# Patient Record
Sex: FEMALE | Race: WHITE | NOT HISPANIC OR LATINO | Employment: FULL TIME | URBAN - METROPOLITAN AREA
[De-identification: names, ages, dates, MRNs, and addresses within clinical notes are randomized per-mention and may not be internally consistent; named-entity substitution may affect disease eponyms.]

---

## 2023-03-23 ENCOUNTER — TELEPHONE (OUTPATIENT)
Dept: PRIMARY CARE | Facility: CLINIC | Age: 48
End: 2023-03-23

## 2023-03-23 NOTE — TELEPHONE ENCOUNTER
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    Work fax is [phone] to [employer].

## 2023-03-23 NOTE — TELEPHONE ENCOUNTER
Called patient, notified of message, patient expressed verbal understanding had no questions at this time.

## 2023-03-23 NOTE — TELEPHONE ENCOUNTER
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 What is the maximum amount of weight she is able to comfortably lift and carry? Thanks,  Leann Tidwell, DO

## 2023-03-23 NOTE — LETTER
March 23, 2023     Patient: Anjali Chamorro   YOB: 1975       To Whom It May Concern:    It is my medical opinion that Anjali Chamorro  should not be required to lift greater than 10 lbs due to her chronic medical conditions .    If you have any questions or concerns, please don't hesitate to call.         Sincerely,        Leann Tidwell, DO

## 2023-04-26 LAB
ALANINE AMINOTRANSFERASE (SGPT) (U/L) IN SER/PLAS: 19 U/L (ref 7–45)
ALBUMIN (G/DL) IN SER/PLAS: 4.2 G/DL (ref 3.4–5)
ALKALINE PHOSPHATASE (U/L) IN SER/PLAS: 116 U/L (ref 33–110)
ANION GAP IN SER/PLAS: 10 MMOL/L (ref 10–20)
ASPARTATE AMINOTRANSFERASE (SGOT) (U/L) IN SER/PLAS: 15 U/L (ref 9–39)
BILIRUBIN TOTAL (MG/DL) IN SER/PLAS: 0.6 MG/DL (ref 0–1.2)
CALCIDIOL (25 OH VITAMIN D3) (NG/ML) IN SER/PLAS: 14 NG/ML
CALCIUM (MG/DL) IN SER/PLAS: 9.2 MG/DL (ref 8.6–10.3)
CARBON DIOXIDE, TOTAL (MMOL/L) IN SER/PLAS: 28 MMOL/L (ref 21–32)
CHLORIDE (MMOL/L) IN SER/PLAS: 105 MMOL/L (ref 98–107)
CHOLESTEROL (MG/DL) IN SER/PLAS: 174 MG/DL (ref 0–199)
CHOLESTEROL IN HDL (MG/DL) IN SER/PLAS: 46.3 MG/DL
CHOLESTEROL/HDL RATIO: 3.8
COBALAMIN (VITAMIN B12) (PG/ML) IN SER/PLAS: 279 PG/ML (ref 211–911)
CREATININE (MG/DL) IN SER/PLAS: 0.58 MG/DL (ref 0.5–1.05)
ERYTHROCYTE DISTRIBUTION WIDTH (RATIO) BY AUTOMATED COUNT: 12.2 % (ref 11.5–14.5)
ERYTHROCYTE MEAN CORPUSCULAR HEMOGLOBIN CONCENTRATION (G/DL) BY AUTOMATED: 32.5 G/DL (ref 32–36)
ERYTHROCYTE MEAN CORPUSCULAR VOLUME (FL) BY AUTOMATED COUNT: 91 FL (ref 80–100)
ERYTHROCYTES (10*6/UL) IN BLOOD BY AUTOMATED COUNT: 5.02 X10E12/L (ref 4–5.2)
ESTIMATED AVERAGE GLUCOSE FOR HBA1C: 189 MG/DL
GFR FEMALE: >90 ML/MIN/1.73M2
GLUCOSE (MG/DL) IN SER/PLAS: 219 MG/DL (ref 74–99)
HEMATOCRIT (%) IN BLOOD BY AUTOMATED COUNT: 45.8 % (ref 36–46)
HEMOGLOBIN (G/DL) IN BLOOD: 14.9 G/DL (ref 12–16)
HEMOGLOBIN A1C/HEMOGLOBIN TOTAL IN BLOOD: 8.2 %
LDL: 103 MG/DL (ref 0–99)
LEUKOCYTES (10*3/UL) IN BLOOD BY AUTOMATED COUNT: 5.4 X10E9/L (ref 4.4–11.3)
PLATELETS (10*3/UL) IN BLOOD AUTOMATED COUNT: 322 X10E9/L (ref 150–450)
POTASSIUM (MMOL/L) IN SER/PLAS: 4.3 MMOL/L (ref 3.5–5.3)
PROTEIN TOTAL: 6.8 G/DL (ref 6.4–8.2)
SODIUM (MMOL/L) IN SER/PLAS: 139 MMOL/L (ref 136–145)
TRIGLYCERIDE (MG/DL) IN SER/PLAS: 123 MG/DL (ref 0–149)
UREA NITROGEN (MG/DL) IN SER/PLAS: 15 MG/DL (ref 6–23)
VLDL: 25 MG/DL (ref 0–40)

## 2023-04-28 ENCOUNTER — TELEPHONE (OUTPATIENT)
Dept: PRIMARY CARE | Facility: CLINIC | Age: 48
End: 2023-04-28

## 2023-04-28 NOTE — TELEPHONE ENCOUNTER
Pt called returning Loren's call regarding lab results. I told her the A1C results, and told her I would have Loren call her back if there was anything to be concerned about.

## 2023-05-30 DIAGNOSIS — B37.31 CANDIDIASIS OF VULVA AND VAGINA: ICD-10-CM

## 2023-05-31 RX ORDER — FLUCONAZOLE 150 MG/1
TABLET ORAL
Qty: 1 TABLET | Refills: 1 | Status: SHIPPED | OUTPATIENT
Start: 2023-05-31 | End: 2024-03-18

## 2023-06-05 ENCOUNTER — TELEPHONE (OUTPATIENT)
Dept: PRIMARY CARE | Facility: CLINIC | Age: 48
End: 2023-06-05

## 2023-06-05 NOTE — TELEPHONE ENCOUNTER
Received PA request for Rybelsus on CMM. Previously done 8/17/2022 and approved for a year. Can you let pharmacy know? Thanks,  Leann Tidwell, DO

## 2023-07-03 ENCOUNTER — TELEPHONE (OUTPATIENT)
Dept: PRIMARY CARE | Facility: CLINIC | Age: 48
End: 2023-07-03

## 2023-07-04 PROBLEM — M72.2 PLANTAR FASCIAL FIBROMATOSIS OF LEFT FOOT: Status: ACTIVE | Noted: 2023-07-04

## 2023-07-04 PROBLEM — F32.1 DEPRESSION, MAJOR, SINGLE EPISODE, MODERATE (MULTI): Status: ACTIVE | Noted: 2023-07-04

## 2023-07-04 PROBLEM — Z86.59 HISTORY OF CLAUSTROPHOBIA: Status: ACTIVE | Noted: 2023-07-04

## 2023-07-04 PROBLEM — G89.29 CHRONIC RADICULAR LOW BACK PAIN: Status: ACTIVE | Noted: 2023-07-04

## 2023-07-04 PROBLEM — K21.9 GERD (GASTROESOPHAGEAL REFLUX DISEASE): Status: ACTIVE | Noted: 2023-07-04

## 2023-07-04 PROBLEM — E53.8 VITAMIN B12 DEFICIENCY: Status: ACTIVE | Noted: 2023-07-04

## 2023-07-04 PROBLEM — G43.009 MIGRAINOUS HEADACHE WITHOUT AURA: Status: ACTIVE | Noted: 2023-07-04

## 2023-07-04 PROBLEM — F41.1 GENERALIZED ANXIETY DISORDER: Status: ACTIVE | Noted: 2023-07-04

## 2023-07-04 PROBLEM — M54.2 CHRONIC NECK PAIN: Status: ACTIVE | Noted: 2023-07-04

## 2023-07-04 PROBLEM — G89.29 CHRONIC NECK PAIN: Status: ACTIVE | Noted: 2023-07-04

## 2023-07-04 PROBLEM — E55.9 VITAMIN D DEFICIENCY: Status: ACTIVE | Noted: 2023-07-04

## 2023-07-04 PROBLEM — Z90.722 S/P BSO (BILATERAL SALPINGO-OOPHORECTOMY): Status: RESOLVED | Noted: 2023-07-04 | Resolved: 2023-07-04

## 2023-07-04 PROBLEM — E66.3 OVERWEIGHT (BMI 25.0-29.9): Status: ACTIVE | Noted: 2023-07-04

## 2023-07-04 PROBLEM — E11.65 UNCONTROLLED TYPE 2 DIABETES MELLITUS WITH HYPERGLYCEMIA (MULTI): Status: ACTIVE | Noted: 2023-07-04

## 2023-07-04 PROBLEM — M54.16 CHRONIC RADICULAR LOW BACK PAIN: Status: ACTIVE | Noted: 2023-07-04

## 2023-07-04 PROBLEM — Z98.890 HISTORY OF NECK SURGERY: Status: RESOLVED | Noted: 2023-07-04 | Resolved: 2023-07-04

## 2023-07-04 PROBLEM — Z90.711 S/P PARTIAL HYSTERECTOMY WITH REMAINING CERVICAL STUMP: Status: RESOLVED | Noted: 2023-07-04 | Resolved: 2023-07-04

## 2023-07-04 RX ORDER — TOPIRAMATE 100 MG/1
100 TABLET, FILM COATED ORAL NIGHTLY
COMMUNITY
End: 2024-03-18

## 2023-07-04 RX ORDER — GLIPIZIDE 10 MG/1
20 TABLET, FILM COATED, EXTENDED RELEASE ORAL 2 TIMES DAILY
COMMUNITY
End: 2024-03-18

## 2023-07-04 RX ORDER — INSULIN GLARGINE 100 [IU]/ML
80 INJECTION, SOLUTION SUBCUTANEOUS NIGHTLY
COMMUNITY
End: 2024-03-18

## 2023-07-04 RX ORDER — FLUOXETINE HYDROCHLORIDE 20 MG/1
20 CAPSULE ORAL DAILY
COMMUNITY
End: 2024-03-18

## 2023-07-04 RX ORDER — MECLIZINE HYDROCHLORIDE 25 MG/1
25 TABLET ORAL 3 TIMES DAILY PRN
COMMUNITY
End: 2024-03-18

## 2023-07-04 RX ORDER — SUMATRIPTAN SUCCINATE 100 MG/1
100 TABLET ORAL DAILY PRN
COMMUNITY
End: 2024-01-29

## 2023-07-04 RX ORDER — ONDANSETRON 4 MG/1
4 TABLET, FILM COATED ORAL EVERY 8 HOURS PRN
COMMUNITY
End: 2024-04-11

## 2023-08-07 ENCOUNTER — TELEPHONE (OUTPATIENT)
Dept: PRIMARY CARE | Facility: CLINIC | Age: 48
End: 2023-08-07

## 2023-08-31 DIAGNOSIS — E11.65 TYPE 2 DIABETES MELLITUS WITH HYPERGLYCEMIA (MULTI): ICD-10-CM

## 2023-08-31 RX ORDER — ORAL SEMAGLUTIDE 14 MG/1
TABLET ORAL
Qty: 30 TABLET | Refills: 11 | Status: SHIPPED | OUTPATIENT
Start: 2023-08-31 | End: 2024-03-22

## 2024-01-08 ENCOUNTER — TELEPHONE (OUTPATIENT)
Dept: PRIMARY CARE | Facility: CLINIC | Age: 49
End: 2024-01-08
Payer: COMMERCIAL

## 2024-01-29 DIAGNOSIS — G43.909 MIGRAINE, UNSPECIFIED, NOT INTRACTABLE, WITHOUT STATUS MIGRAINOSUS: ICD-10-CM

## 2024-01-29 RX ORDER — SUMATRIPTAN SUCCINATE 100 MG/1
TABLET ORAL
Qty: 9 TABLET | Refills: 11 | Status: SHIPPED | OUTPATIENT
Start: 2024-01-29

## 2024-03-18 ENCOUNTER — TELEPHONE (OUTPATIENT)
Dept: PRIMARY CARE | Facility: CLINIC | Age: 49
End: 2024-03-18
Payer: COMMERCIAL

## 2024-03-18 DIAGNOSIS — B37.31 CANDIDIASIS OF VULVA AND VAGINA: ICD-10-CM

## 2024-03-18 DIAGNOSIS — H81.10 BENIGN PAROXYSMAL VERTIGO, UNSPECIFIED EAR: ICD-10-CM

## 2024-03-18 DIAGNOSIS — F41.1 GENERALIZED ANXIETY DISORDER: ICD-10-CM

## 2024-03-18 DIAGNOSIS — G43.909 MIGRAINE, UNSPECIFIED, NOT INTRACTABLE, WITHOUT STATUS MIGRAINOSUS: ICD-10-CM

## 2024-03-18 DIAGNOSIS — E11.65 TYPE 2 DIABETES MELLITUS WITH HYPERGLYCEMIA (MULTI): ICD-10-CM

## 2024-03-18 RX ORDER — INSULIN GLARGINE 100 [IU]/ML
INJECTION, SOLUTION SUBCUTANEOUS
Qty: 24 ML | Refills: 11 | Status: SHIPPED | OUTPATIENT
Start: 2024-03-18 | End: 2024-04-19 | Stop reason: SDUPTHER

## 2024-03-18 RX ORDER — TOPIRAMATE 100 MG/1
TABLET, FILM COATED ORAL
Qty: 30 TABLET | Refills: 11 | Status: SHIPPED | OUTPATIENT
Start: 2024-03-18

## 2024-03-18 RX ORDER — FLUCONAZOLE 150 MG/1
TABLET ORAL
Qty: 1 TABLET | Refills: 1 | Status: SHIPPED | OUTPATIENT
Start: 2024-03-18 | End: 2024-04-22

## 2024-03-18 RX ORDER — MECLIZINE HYDROCHLORIDE 25 MG/1
25 TABLET ORAL 3 TIMES DAILY PRN
Qty: 30 TABLET | Refills: 11 | Status: SHIPPED | OUTPATIENT
Start: 2024-03-18

## 2024-03-18 RX ORDER — GLIPIZIDE 10 MG/1
10 TABLET, FILM COATED, EXTENDED RELEASE ORAL 2 TIMES DAILY
Qty: 180 TABLET | Refills: 3 | Status: SHIPPED | OUTPATIENT
Start: 2024-03-18

## 2024-03-18 RX ORDER — FLUOXETINE HYDROCHLORIDE 20 MG/1
20 CAPSULE ORAL DAILY
Qty: 30 CAPSULE | Refills: 11 | Status: SHIPPED | OUTPATIENT
Start: 2024-03-18

## 2024-03-21 ENCOUNTER — TELEPHONE (OUTPATIENT)
Dept: PRIMARY CARE | Facility: CLINIC | Age: 49
End: 2024-03-21
Payer: COMMERCIAL

## 2024-03-21 DIAGNOSIS — E11.65 TYPE 2 DIABETES MELLITUS WITH HYPERGLYCEMIA (MULTI): ICD-10-CM

## 2024-03-21 NOTE — TELEPHONE ENCOUNTER
New rx sent in on the 18th. I did the prior auth and received notification that medication is covered/prior auth is not needed. Can you let her know?     Can you verify that the insurance information we have on file is correct?  Thanks,  Leann Tidwell, DO

## 2024-03-21 NOTE — TELEPHONE ENCOUNTER
Talked with pt and she said that she is needing a refill on her Basaglar but it was needing a prior auth.

## 2024-03-22 RX ORDER — ORAL SEMAGLUTIDE 14 MG/1
TABLET ORAL
Qty: 30 TABLET | Refills: 11 | Status: SHIPPED | OUTPATIENT
Start: 2024-03-22 | End: 2024-04-11

## 2024-04-01 ENCOUNTER — TELEPHONE (OUTPATIENT)
Dept: PRIMARY CARE | Facility: CLINIC | Age: 49
End: 2024-04-01
Payer: COMMERCIAL

## 2024-04-01 DIAGNOSIS — E11.65 UNCONTROLLED TYPE 2 DIABETES MELLITUS WITH HYPERGLYCEMIA (MULTI): Primary | ICD-10-CM

## 2024-04-01 NOTE — TELEPHONE ENCOUNTER
Prior authorization request received via fax for [email or web address]     Form given to: PLACED IN LEAD MA'S BOX ON 4/1/2024

## 2024-04-11 DIAGNOSIS — R11.0 NAUSEA: ICD-10-CM

## 2024-04-11 RX ORDER — ONDANSETRON 4 MG/1
4 TABLET, FILM COATED ORAL EVERY 8 HOURS PRN
Qty: 30 TABLET | Refills: 11 | Status: SHIPPED | OUTPATIENT
Start: 2024-04-11

## 2024-04-11 RX ORDER — DULAGLUTIDE 0.75 MG/.5ML
0.75 INJECTION, SOLUTION SUBCUTANEOUS
Qty: 2 ML | Refills: 11 | Status: SHIPPED | OUTPATIENT
Start: 2024-04-14

## 2024-04-11 NOTE — TELEPHONE ENCOUNTER
Insurance is denying the Rybelsus again I let her insurance know that she has tried Victoza but they also want her to have tried weekly injectable Trulicity. Is she ok with trying Trulicity? Thanks,  Leann Tidwell, DO

## 2024-04-12 ENCOUNTER — TELEPHONE (OUTPATIENT)
Dept: PRIMARY CARE | Facility: CLINIC | Age: 49
End: 2024-04-12
Payer: COMMERCIAL

## 2024-04-12 DIAGNOSIS — E11.65 UNCONTROLLED TYPE 2 DIABETES MELLITUS WITH HYPERGLYCEMIA (MULTI): Primary | ICD-10-CM

## 2024-04-12 NOTE — TELEPHONE ENCOUNTER
Jardiance and Farxiga are a different type of medication that is in pill form if she would like to try either of those. Can you ask patient? Thanks,  Leann Tidwell, DO

## 2024-04-12 NOTE — TELEPHONE ENCOUNTER
Talked with pt and said she will do the Trulicity for the month she she already picked it up.  She would like to try the better of the two new medications (Jardiance or Farxiga) next month.  She was wondering if we would be able to send in a script for next month?  Thanks:)

## 2024-04-12 NOTE — TELEPHONE ENCOUNTER
Pt called and said that she picked up her Trulicity and doesn't want to do injections.  She isn't understanding why her Rybelsus isn't covered when it has been in the past.  Is there anything she can try that isn't injections? Please advise.

## 2024-04-15 ENCOUNTER — TELEPHONE (OUTPATIENT)
Dept: PRIMARY CARE | Facility: CLINIC | Age: 49
End: 2024-04-15
Payer: COMMERCIAL

## 2024-04-15 NOTE — TELEPHONE ENCOUNTER
I sent in the Jardiance now just incase it will require a prior auth. Ok to stop the Trulicity once she starts the Jardiance. Thanks,  Leann Tidwell, DO

## 2024-04-19 DIAGNOSIS — B37.31 CANDIDIASIS OF VULVA AND VAGINA: ICD-10-CM

## 2024-04-19 DIAGNOSIS — E11.65 TYPE 2 DIABETES MELLITUS WITH HYPERGLYCEMIA (MULTI): ICD-10-CM

## 2024-04-19 RX ORDER — INSULIN GLARGINE 100 [IU]/ML
80 INJECTION, SOLUTION SUBCUTANEOUS NIGHTLY
Qty: 24 ML | Refills: 11 | Status: SHIPPED | OUTPATIENT
Start: 2024-04-19

## 2024-04-19 NOTE — TELEPHONE ENCOUNTER
"Basaglar is not covered despite PA stating \"PA not needed\" I looked up the formulary and name brand Lantus is the preferred alternative. Do you want to send this in instead?  "

## 2024-04-22 RX ORDER — FLUCONAZOLE 150 MG/1
TABLET ORAL
Qty: 1 TABLET | Refills: 1 | Status: SHIPPED | OUTPATIENT
Start: 2024-04-22

## 2024-06-28 ENCOUNTER — TELEPHONE (OUTPATIENT)
Dept: PRIMARY CARE | Facility: CLINIC | Age: 49
End: 2024-06-28

## 2024-06-28 ENCOUNTER — OFFICE VISIT (OUTPATIENT)
Dept: PRIMARY CARE | Facility: CLINIC | Age: 49
End: 2024-06-28
Payer: COMMERCIAL

## 2024-06-28 VITALS
DIASTOLIC BLOOD PRESSURE: 77 MMHG | SYSTOLIC BLOOD PRESSURE: 115 MMHG | BODY MASS INDEX: 24.73 KG/M2 | OXYGEN SATURATION: 97 % | RESPIRATION RATE: 14 BRPM | HEART RATE: 95 BPM | HEIGHT: 69 IN | TEMPERATURE: 97.1 F | WEIGHT: 167 LBS

## 2024-06-28 DIAGNOSIS — E11.65 TYPE 2 DIABETES MELLITUS WITH HYPERGLYCEMIA (MULTI): ICD-10-CM

## 2024-06-28 DIAGNOSIS — H81.10 BENIGN PAROXYSMAL VERTIGO, UNSPECIFIED EAR: ICD-10-CM

## 2024-06-28 DIAGNOSIS — J30.2 SEASONAL ALLERGIES: Primary | ICD-10-CM

## 2024-06-28 PROCEDURE — 99213 OFFICE O/P EST LOW 20 MIN: CPT

## 2024-06-28 PROCEDURE — 3074F SYST BP LT 130 MM HG: CPT

## 2024-06-28 PROCEDURE — 3078F DIAST BP <80 MM HG: CPT

## 2024-06-28 PROCEDURE — 1036F TOBACCO NON-USER: CPT

## 2024-06-28 RX ORDER — ONDANSETRON 4 MG/1
TABLET, ORALLY DISINTEGRATING ORAL
COMMUNITY
Start: 2024-06-24

## 2024-06-28 RX ORDER — INSULIN GLARGINE 100 [IU]/ML
80 INJECTION, SOLUTION SUBCUTANEOUS NIGHTLY
Qty: 24 ML | Refills: 3 | Status: SHIPPED | OUTPATIENT
Start: 2024-06-28

## 2024-06-28 RX ORDER — MECLIZINE HYDROCHLORIDE 25 MG/1
25 TABLET ORAL 3 TIMES DAILY PRN
Qty: 30 TABLET | Refills: 11 | Status: SHIPPED | OUTPATIENT
Start: 2024-06-28

## 2024-06-28 RX ORDER — MINERAL OIL
180 ENEMA (ML) RECTAL DAILY
Qty: 30 TABLET | Refills: 5 | Status: SHIPPED | OUTPATIENT
Start: 2024-06-28 | End: 2025-06-28

## 2024-06-28 RX ORDER — GLIPIZIDE 10 MG/1
10 TABLET, FILM COATED, EXTENDED RELEASE ORAL 2 TIMES DAILY
Qty: 180 TABLET | Refills: 3 | Status: SHIPPED | OUTPATIENT
Start: 2024-06-28

## 2024-06-28 SDOH — ECONOMIC STABILITY: FOOD INSECURITY: WITHIN THE PAST 12 MONTHS, YOU WORRIED THAT YOUR FOOD WOULD RUN OUT BEFORE YOU GOT MONEY TO BUY MORE.: NEVER TRUE

## 2024-06-28 SDOH — ECONOMIC STABILITY: FOOD INSECURITY: WITHIN THE PAST 12 MONTHS, THE FOOD YOU BOUGHT JUST DIDN'T LAST AND YOU DIDN'T HAVE MONEY TO GET MORE.: NEVER TRUE

## 2024-06-28 ASSESSMENT — ANXIETY QUESTIONNAIRES
6. BECOMING EASILY ANNOYED OR IRRITABLE: NOT AT ALL
GAD7 TOTAL SCORE: 0
3. WORRYING TOO MUCH ABOUT DIFFERENT THINGS: NOT AT ALL
1. FEELING NERVOUS, ANXIOUS, OR ON EDGE: NOT AT ALL
2. NOT BEING ABLE TO STOP OR CONTROL WORRYING: NOT AT ALL
4. TROUBLE RELAXING: NOT AT ALL
IF YOU CHECKED OFF ANY PROBLEMS ON THIS QUESTIONNAIRE, HOW DIFFICULT HAVE THESE PROBLEMS MADE IT FOR YOU TO DO YOUR WORK, TAKE CARE OF THINGS AT HOME, OR GET ALONG WITH OTHER PEOPLE: NOT DIFFICULT AT ALL
5. BEING SO RESTLESS THAT IT IS HARD TO SIT STILL: NOT AT ALL
7. FEELING AFRAID AS IF SOMETHING AWFUL MIGHT HAPPEN: NOT AT ALL

## 2024-06-28 ASSESSMENT — LIFESTYLE VARIABLES
SKIP TO QUESTIONS 9-10: 1
HOW OFTEN DO YOU HAVE SIX OR MORE DRINKS ON ONE OCCASION: NEVER
HOW OFTEN DO YOU HAVE A DRINK CONTAINING ALCOHOL: NEVER
HOW MANY STANDARD DRINKS CONTAINING ALCOHOL DO YOU HAVE ON A TYPICAL DAY: PATIENT DOES NOT DRINK
AUDIT-C TOTAL SCORE: 0

## 2024-06-28 ASSESSMENT — ENCOUNTER SYMPTOMS
GASTROINTESTINAL NEGATIVE: 1
CARDIOVASCULAR NEGATIVE: 1
RESPIRATORY NEGATIVE: 1
ALLERGIC/IMMUNOLOGIC NEGATIVE: 1
HEMATOLOGIC/LYMPHATIC NEGATIVE: 1
EYES NEGATIVE: 1
PSYCHIATRIC NEGATIVE: 1
NEUROLOGICAL NEGATIVE: 1
ENDOCRINE NEGATIVE: 1
CONSTITUTIONAL NEGATIVE: 1

## 2024-06-28 ASSESSMENT — PAIN SCALES - GENERAL: PAINLEVEL: 10-WORST PAIN EVER

## 2024-06-28 ASSESSMENT — PATIENT HEALTH QUESTIONNAIRE - PHQ9
2. FEELING DOWN, DEPRESSED OR HOPELESS: NOT AT ALL
1. LITTLE INTEREST OR PLEASURE IN DOING THINGS: NOT AT ALL
SUM OF ALL RESPONSES TO PHQ9 QUESTIONS 1 & 2: 0

## 2024-06-28 NOTE — PATIENT INSTRUCTIONS
Thank you for coming to see me today.  If you have any questions or concerns following our visit, please contact the office.  Phone: [phone]    Follow up with Dr. Tidwell as scheduled    1)  I have ordered lab work for you to have completed before your appointment with Dr. Tidwell

## 2024-06-28 NOTE — LETTER
June 28, 2024     Patient: Anjali Chamorro   YOB: 1975   Date of Visit: 6/28/2024       To Whom It May Concern:    Anjali Chamorro was seen in my clinic on 6/28/2024 at. Please excuse Anjali for her absence from work on 6/28 and 6/29 due to illness and to make her appointment.    If you have any questions or concerns, please don't hesitate to call.         Sincerely,         Monika Carcamo, APRN-CNP        CC: No Recipients

## 2024-06-28 NOTE — TELEPHONE ENCOUNTER
Prior authorization request received via fax for FEXOFENADINE     Form given to: PLACED IN LEAD MA'S BOX ON 6/28     Prior authorization request received via fax for BASAGLAR KWIKPEN     Form given to: PLACED IN LEAD MA'S BOX ON 6/28

## 2024-06-28 NOTE — PROGRESS NOTES
"Subjective   Patient ID: Anjali Chamorro is a 49 y.o. female who presents for Foot Swelling (For 1 month).    HPI   Anjali presents for issues with side effects that started when she started taking Jardiance and Trulicity- she has also had issues with constipation since starting the trulicity. She would like to go back on her glipizide and Basaglar because she did not have any of those side effects.     Review of Systems   Constitutional: Negative.    HENT: Negative.     Eyes: Negative.    Respiratory: Negative.     Cardiovascular: Negative.    Gastrointestinal: Negative.    Endocrine: Negative.    Genitourinary: Negative.    Musculoskeletal:         Feet swelling   Skin: Negative.    Allergic/Immunologic: Negative.    Neurological: Negative.    Hematological: Negative.    Psychiatric/Behavioral: Negative.         Objective   /77 (BP Location: Right arm, Patient Position: Sitting)   Pulse 95   Temp 36.2 °C (97.1 °F) (Temporal)   Resp 14   Ht 1.74 m (5' 8.5\")   Wt 75.8 kg (167 lb)   SpO2 97%   BMI 25.02 kg/m²     Physical Exam  Cardiovascular:      Rate and Rhythm: Normal rate and regular rhythm.      Pulses: Normal pulses.      Heart sounds: Normal heart sounds.   Pulmonary:      Effort: Pulmonary effort is normal.      Breath sounds: Normal breath sounds.   Musculoskeletal:         General: Swelling present.      Comments: Bilateral foot swelling   Skin:     Capillary Refill: Capillary refill takes less than 2 seconds.   Neurological:      Mental Status: She is oriented to person, place, and time.   Psychiatric:         Mood and Affect: Mood normal.         Behavior: Behavior normal.         Thought Content: Thought content normal.         Judgment: Judgment normal.         Assessment/Plan   Problem List Items Addressed This Visit    None  Visit Diagnoses         Codes    Seasonal allergies    -  Primary J30.2    Relevant Medications    fexofenadine (Allegra) 180 mg tablet    Type 2 diabetes mellitus " with hyperglycemia (Multi)     E11.65    Relevant Medications    insulin glargine (Basaglar KwikPen U-100 Insulin) 100 unit/mL (3 mL) pen    glipiZIDE XL (Glucotrol XL) 10 mg 24 hr tablet    Other Relevant Orders    Comprehensive Metabolic Panel    Hemoglobin A1C    Lipid Panel    Benign paroxysmal vertigo, unspecified ear     H81.10    Relevant Medications    meclizine (Antivert) 25 mg tablet    Other Relevant Orders    Vitamin D 25-Hydroxy,Total (for eval of Vitamin D levels)    CBC    Vitamin B12

## 2024-07-03 ENCOUNTER — TELEPHONE (OUTPATIENT)
Dept: PRIMARY CARE | Facility: CLINIC | Age: 49
End: 2024-07-03
Payer: COMMERCIAL

## 2024-07-03 NOTE — TELEPHONE ENCOUNTER
PA for the generic Allergra was denied. Her insurance wants her to try desloratadine so I sent in rx for that medication. Can you let her know about change in medication due to insurance? Thanks,  Leann Tidwell, DO

## 2024-07-03 NOTE — TELEPHONE ENCOUNTER
Prior authorization request received via fax for levocetirizine dihydrochloride    Form given to: PLACED IN LEAD MA'S BOX ON 7/3/2024

## 2024-07-08 ENCOUNTER — TELEPHONE (OUTPATIENT)
Dept: PRIMARY CARE | Facility: CLINIC | Age: 49
End: 2024-07-08
Payer: COMMERCIAL

## 2024-07-08 DIAGNOSIS — J30.89 ENVIRONMENTAL AND SEASONAL ALLERGIES: ICD-10-CM

## 2024-07-08 DIAGNOSIS — E11.65 TYPE 2 DIABETES MELLITUS WITH HYPERGLYCEMIA (MULTI): ICD-10-CM

## 2024-07-08 NOTE — TELEPHONE ENCOUNTER
Pt called in stating that [facility] did not receive her insulin glargine (Basaglar KwikPen U-100 Insulin) 100 unit/mL (3 mL) pen and desloratadine (Clarinex Reditab) 5 mg disintegrating tablet . Please advise.

## 2024-07-09 RX ORDER — DESLORATADINE 5 MG/1
5 TABLET, ORALLY DISINTEGRATING ORAL DAILY
Qty: 30 TABLET | Refills: 11 | OUTPATIENT
Start: 2024-07-09 | End: 2025-07-09

## 2024-07-09 RX ORDER — INSULIN GLARGINE 100 [IU]/ML
80 INJECTION, SOLUTION SUBCUTANEOUS NIGHTLY
Qty: 24 ML | Refills: 3 | OUTPATIENT
Start: 2024-07-09

## 2024-07-17 ENCOUNTER — TELEPHONE (OUTPATIENT)
Dept: PRIMARY CARE | Facility: CLINIC | Age: 49
End: 2024-07-17
Payer: COMMERCIAL

## 2024-07-17 NOTE — TELEPHONE ENCOUNTER
Pt called in requesting an update on the status of the prior authorization for her Basaglar Kwikipen. Pt states she has still not received this. Please advise.

## 2024-07-17 NOTE — TELEPHONE ENCOUNTER
Patient called back. This does in fact need a PA. This was the number that she was given to give to us to call [phone].    Patient wants it noted that all other medications make her sick, this is the only one that doesn't make her sick    Routing to Sean for insight.

## 2024-07-19 NOTE — TELEPHONE ENCOUNTER
PA was submitted over the phone, should have 24 hr turn-around time. Patient was notified. I will call her once we get the response from her insurance.

## 2024-08-02 NOTE — TELEPHONE ENCOUNTER
PA submitted to Jessica (PA not needed for [employer]). I made sure that her allergies were included in the information submitted. Can you let her know? Thanks,  Leann Tidwell, DO

## 2024-08-02 NOTE — TELEPHONE ENCOUNTER
That information was given over the phone to the representative and I have not seen a response to from her plan yet. Please advise.

## 2024-08-02 NOTE — TELEPHONE ENCOUNTER
Pt called back in and stated she received a denial letter today. Pt states the letter says the medication was denied due to no supporting documentation that she tried lantus and it does not work. Pt states she told PCP that she is allergic to lantus and it makes her have severe vomiting. Pt is unsure what the next steps are and expressed frustration that this documentation was not sent in, please advise.

## 2024-08-02 NOTE — TELEPHONE ENCOUNTER
I tried doing the PA for her [employer] and it says PA not needed for this medication. Will try doing PA for her [place] insurance.   Leann Tidwell, DO

## 2024-08-07 NOTE — TELEPHONE ENCOUNTER
Got notification that [employer] [employer] is incorrect plan. Resubmitted to [employer].   Leann Tidwell, DO

## 2024-08-27 ENCOUNTER — DOCUMENTATION (OUTPATIENT)
Dept: PRIMARY CARE | Facility: CLINIC | Age: 49
End: 2024-08-27
Payer: COMMERCIAL

## 2024-09-16 ENCOUNTER — OFFICE VISIT (OUTPATIENT)
Dept: URGENT CARE | Age: 49
End: 2024-09-16
Payer: COMMERCIAL

## 2024-09-16 VITALS
HEART RATE: 68 BPM | DIASTOLIC BLOOD PRESSURE: 79 MMHG | RESPIRATION RATE: 16 BRPM | SYSTOLIC BLOOD PRESSURE: 109 MMHG | TEMPERATURE: 97.4 F | OXYGEN SATURATION: 98 %

## 2024-09-16 DIAGNOSIS — M79.672 FOOT PAIN, LEFT: ICD-10-CM

## 2024-09-16 DIAGNOSIS — M79.672 FOOT PAIN, LEFT: Primary | ICD-10-CM

## 2024-09-16 PROCEDURE — 3078F DIAST BP <80 MM HG: CPT | Performed by: NURSE PRACTITIONER

## 2024-09-16 PROCEDURE — 3074F SYST BP LT 130 MM HG: CPT | Performed by: NURSE PRACTITIONER

## 2024-09-16 PROCEDURE — 99214 OFFICE O/P EST MOD 30 MIN: CPT | Performed by: NURSE PRACTITIONER

## 2024-09-16 PROCEDURE — 73630 X-RAY EXAM OF FOOT: CPT | Performed by: NURSE PRACTITIONER

## 2024-09-16 NOTE — PROGRESS NOTES
Subjective   Patient ID: Anjali Chamorro is a 49 y.o. female. They present today with a chief complaint of Foot Pain (L foot pain s/p fall on Friday/Tripped over tree roots).    History of Present Illness  HPI  Patient is a 49-year-old female who Presents with foot injury.  She states she tripped over a branch that was on the ground in her yard falling on Friday.  She has an abrasion to her left shin and bruising and pain to her left foot.   Past Medical History  Allergies as of 09/16/2024 - Reviewed 06/28/2024   Allergen Reaction Noted    Prednisone Swelling 07/04/2023    Aspirin Other 07/04/2023    Famotidine Other 07/04/2023    Insulin aspart u-100 Other 07/04/2023    Insulin detemir Other 07/04/2023    Insulin glargine Nausea Only and Other 07/04/2023    Metformin Other 07/04/2023    Penicillins Hives and Angioedema 07/04/2023    Pioglitazone Nausea Only and Other 07/04/2023    Sodium citrate-citric acid Other 07/04/2023    Adhesive tape-silicones Rash 07/04/2023    Chlorhexidine Rash 07/04/2023    Povidone-iodine Rash 07/04/2023       (Not in a hospital admission)       Past Medical History:   Diagnosis Date    Body mass index (BMI) 26.0-26.9, adult 06/23/2020    Body mass index (BMI) of 26.0 to 26.9 in adult    Burn of unspecified degree of unspecified hand, unspecified site, initial encounter     Burn of hand    Candidiasis of skin and nail 04/14/2021    Candidal intertrigo    Candidiasis, unspecified 11/01/2019    Yeast infection    Encounter for follow-up examination after completed treatment for conditions other than malignant neoplasm 12/05/2019    Postop check    Encounter for other preprocedural examination 06/08/2021    Preoperative examination    Encounter for other specified surgical aftercare 12/05/2019    Encounter for postoperative wound check    History of neck surgery 07/04/2023    Mixed irritable bowel syndrome     Irritable bowel syndrome with constipation and diarrhea    Muscle weakness  (generalized) 12/12/2019    Weakness of trunk musculature    Nausea     Nausea in adult    Other injury of unspecified body region, initial encounter 12/08/2020    Open wound    Other specified counseling     Counseling on health promotion and disease prevention    Other specified counseling 10/31/2019    Encounter for surgical counseling    Other specified disorders of muscle 12/12/2019    Hamstring tightness    Otitis media, unspecified, right ear     Right otitis media    Periapical abscess without sinus 12/09/2019    Dental infection    Personal history of other complications of pregnancy, childbirth and the puerperium     History of galactorrhea    Personal history of other diseases of the digestive system     History of esophageal reflux    Personal history of other diseases of the digestive system 12/18/2020    History of constipation    Personal history of other diseases of the digestive system 03/11/2020    History of esophageal ulcer    Personal history of other diseases of the female genital tract 10/31/2019    History of vaginitis    Personal history of other diseases of the female genital tract 10/31/2019    History of vaginal discharge    Personal history of other diseases of the musculoskeletal system and connective tissue     History of trigger finger    Personal history of other diseases of the respiratory system     History of influenza    Personal history of other diseases of the respiratory system     History of sinusitis    Personal history of other endocrine, nutritional and metabolic disease     History of hyperlipidemia    Personal history of other endocrine, nutritional and metabolic disease     History of type 2 diabetes mellitus    Personal history of other endocrine, nutritional and metabolic disease     History of diabetes mellitus    Personal history of other endocrine, nutritional and metabolic disease 12/15/2020    History of type 2 diabetes mellitus    Personal history of other  infectious and parasitic diseases     History of herpes genitalis    Personal history of other infectious and parasitic diseases     History of herpes genitalis    Personal history of other infectious and parasitic diseases 02/10/2022    History of candidiasis of vagina    Personal history of other specified conditions     History of vertigo    Personal history of other specified conditions     History of headache    Personal history of other specified conditions 08/14/2021    History of vertigo    Personal history of other specified conditions 04/03/2017    History of heartburn    Personal history of other specified conditions 10/24/2018    History of epigastric pain    Personal history of other specified conditions 04/14/2021    History of dysphagia    Personal history of other specified conditions     History of nausea and vomiting    Personal history of urinary (tract) infections     History of acute cystitis    Personal history of urinary (tract) infections 04/14/2021    History of urinary tract infection    S/P BSO (bilateral salpingo-oophorectomy) 07/04/2023    S/p partial hysterectomy with remaining cervical stump 07/04/2023    Sprain of unspecified part of left wrist and hand, initial encounter 07/12/2020    Hand sprain, left, initial encounter    Strain of muscle, fascia and tendon at neck level, initial encounter     Cervical strain    Unspecified injury of left shoulder and upper arm, initial encounter 07/12/2020    Upper extremity injury, left, initial encounter    Unspecified sprain of left wrist, initial encounter 07/12/2020    Wrist sprain, left, initial encounter    Urinary tract infection, site not specified 07/19/2021    Acute UTI       Past Surgical History:   Procedure Laterality Date    ANKLE SURGERY  09/06/2016    Ankle Surgery    FOOT SURGERY  09/06/2016    Foot Surgery    HYSTERECTOMY  09/06/2016    Hysterectomy    NECK SURGERY  09/06/2016    Neck Surgery    OTHER SURGICAL HISTORY   10/31/2019    Mastectomy bilateral    OTHER SURGICAL HISTORY  04/14/2021    Oophorectomy bilateral    OTHER SURGICAL HISTORY  10/14/2021    Trigger finger repair    OTHER SURGICAL HISTORY  10/22/2018    Neurorrhaphy Left Thumb    TUBAL LIGATION  09/06/2016    Tubal Ligation        reports that she has never smoked. She has never been exposed to tobacco smoke. She has never used smokeless tobacco. She reports that she does not drink alcohol and does not use drugs.    Review of Systems  Review of Systems  Gen: No fatigue, fever, sweats.  Head: No headache, trauma.  Eyes: No vision loss, double vision, drainage, eye pain.  ENT: No hearing changes, pain, epistaxis, congestion  Cardiac: No chest pain  Pulmonary: No shortness of breath,  pleuritic pain,   Heme/lymph: No swollen glands  GI: No abdominal pain, nausea, vomiting, diarrhea  : No  dysuria, frequency, urgency, hematuria  Musculoskeletal: No limb pain, joint pain, back pain, joint swelling or stiffness.  Skin: No rashes, pruritus, lumps, lesions.  Neuro: No Numbness, tingling, or weakness.  Psych: No  anxiety     Review of systems is otherwise negative unless stated above or in history of present illness.                             Objective    Vitals:    09/16/24 0953   BP: 109/79   Pulse: 68   Resp: 16   Temp: 36.3 °C (97.4 °F)   SpO2: 98%     No LMP recorded.    Physical Exam  Foot Injury:  General: Vitals noted. No distress. Afebrile.  Neck: Supple. No adenopathy.  Cardiac: Regular rate and rhythm. No murmur.  Pulmonary: Equal breath sounds bilaterally. No adventitious breath sounds.  Abdomen: Soft, nontender, nonsurgical. Normoactive bowel sounds  Back: Nontender throughout  Lower extremity: Exam of the left foot shows + swelling and bruising to base of great toe on top and plantar aspect. The ankle is non-tender. + shin abrasion on left.  There is no deformity, edema, ecchymosis. Is neurovascularly intact distally. The remainder of the lower extremity is  non-tender.  Skin: + shin abrasion on left  Neuro: No focal neurologic deficits, NIH score of 0.  Procedures    Point of Care Test & Imaging Results from this visit  No results found for this visit on 09/16/24.   No results found.    Diagnostic study results (if any) were reviewed by MARCIN Hensley.    Assessment/Plan   Allergies, medications, history, and pertinent labs/EKGs/Imaging reviewed by MARCIN Hensley.     Medical Decision Making  History and physical is consistent with foot contusion.  No signs of fracture on xray.  No dislocation of joint.  Rest ice and elevation along with tylenol or motrin for pain.    Orders and Diagnoses  There are no diagnoses linked to this encounter.    Medical Admin Record      Follow Up Instructions  No follow-ups on file.    Patient disposition: Home    Electronically signed by MARCIN Hensley  9:56 AM

## 2024-09-16 NOTE — PATIENT INSTRUCTIONS
No fracture on xray.  You have a contusion which is bruising.  Rest ice and elevation with tylenol or motrin for pain.   21-Jul-2023 16:26

## 2024-10-02 ENCOUNTER — LAB (OUTPATIENT)
Dept: LAB | Facility: LAB | Age: 49
End: 2024-10-02
Payer: COMMERCIAL

## 2024-10-02 DIAGNOSIS — H81.10 BENIGN PAROXYSMAL VERTIGO, UNSPECIFIED EAR: ICD-10-CM

## 2024-10-02 DIAGNOSIS — E11.65 TYPE 2 DIABETES MELLITUS WITH HYPERGLYCEMIA (MULTI): ICD-10-CM

## 2024-10-02 LAB
25(OH)D3 SERPL-MCNC: 13 NG/ML (ref 30–100)
ALBUMIN SERPL BCP-MCNC: 4 G/DL (ref 3.4–5)
ALP SERPL-CCNC: 165 U/L (ref 33–110)
ALT SERPL W P-5'-P-CCNC: 31 U/L (ref 7–45)
ANION GAP SERPL CALC-SCNC: 11 MMOL/L (ref 10–20)
AST SERPL W P-5'-P-CCNC: 30 U/L (ref 9–39)
BILIRUB SERPL-MCNC: 0.6 MG/DL (ref 0–1.2)
BUN SERPL-MCNC: 17 MG/DL (ref 6–23)
CALCIUM SERPL-MCNC: 8.4 MG/DL (ref 8.6–10.3)
CHLORIDE SERPL-SCNC: 103 MMOL/L (ref 98–107)
CHOLEST SERPL-MCNC: 219 MG/DL (ref 0–199)
CHOLESTEROL/HDL RATIO: 4.5
CO2 SERPL-SCNC: 27 MMOL/L (ref 21–32)
CREAT SERPL-MCNC: 0.55 MG/DL (ref 0.5–1.05)
EGFRCR SERPLBLD CKD-EPI 2021: >90 ML/MIN/1.73M*2
ERYTHROCYTE [DISTWIDTH] IN BLOOD BY AUTOMATED COUNT: 13.2 % (ref 11.5–14.5)
GLUCOSE SERPL-MCNC: 255 MG/DL (ref 74–99)
HCT VFR BLD AUTO: 46 % (ref 36–46)
HDLC SERPL-MCNC: 48.9 MG/DL
HGB BLD-MCNC: 15 G/DL (ref 12–16)
LDLC SERPL CALC-MCNC: 152 MG/DL
MCH RBC QN AUTO: 28.8 PG (ref 26–34)
MCHC RBC AUTO-ENTMCNC: 32.6 G/DL (ref 32–36)
MCV RBC AUTO: 88 FL (ref 80–100)
NON HDL CHOLESTEROL: 170 MG/DL (ref 0–149)
NRBC BLD-RTO: 0 /100 WBCS (ref 0–0)
PLATELET # BLD AUTO: 301 X10*3/UL (ref 150–450)
POTASSIUM SERPL-SCNC: 4.3 MMOL/L (ref 3.5–5.3)
PROT SERPL-MCNC: 6.5 G/DL (ref 6.4–8.2)
RBC # BLD AUTO: 5.21 X10*6/UL (ref 4–5.2)
SODIUM SERPL-SCNC: 137 MMOL/L (ref 136–145)
TRIGL SERPL-MCNC: 89 MG/DL (ref 0–149)
VIT B12 SERPL-MCNC: 411 PG/ML (ref 211–911)
VLDL: 18 MG/DL (ref 0–40)
WBC # BLD AUTO: 5.2 X10*3/UL (ref 4.4–11.3)

## 2024-10-02 PROCEDURE — 82607 VITAMIN B-12: CPT

## 2024-10-02 PROCEDURE — 83036 HEMOGLOBIN GLYCOSYLATED A1C: CPT

## 2024-10-02 PROCEDURE — 80053 COMPREHEN METABOLIC PANEL: CPT

## 2024-10-02 PROCEDURE — 85027 COMPLETE CBC AUTOMATED: CPT

## 2024-10-02 PROCEDURE — 80061 LIPID PANEL: CPT

## 2024-10-02 PROCEDURE — 82306 VITAMIN D 25 HYDROXY: CPT

## 2024-10-02 PROCEDURE — 36415 COLL VENOUS BLD VENIPUNCTURE: CPT

## 2024-10-03 LAB
EST. AVERAGE GLUCOSE BLD GHB EST-MCNC: 324 MG/DL
HBA1C MFR BLD: 12.9 %

## 2024-10-07 ENCOUNTER — APPOINTMENT (OUTPATIENT)
Dept: PRIMARY CARE | Facility: CLINIC | Age: 49
End: 2024-10-07
Payer: COMMERCIAL

## 2024-10-07 VITALS
HEART RATE: 74 BPM | HEIGHT: 69 IN | RESPIRATION RATE: 20 BRPM | SYSTOLIC BLOOD PRESSURE: 105 MMHG | BODY MASS INDEX: 25.18 KG/M2 | OXYGEN SATURATION: 99 % | WEIGHT: 170 LBS | DIASTOLIC BLOOD PRESSURE: 64 MMHG | TEMPERATURE: 96.8 F

## 2024-10-07 DIAGNOSIS — E11.65 UNCONTROLLED TYPE 2 DIABETES MELLITUS WITH HYPERGLYCEMIA: Primary | ICD-10-CM

## 2024-10-07 DIAGNOSIS — B37.31 CANDIDIASIS OF VULVA AND VAGINA: ICD-10-CM

## 2024-10-07 PROBLEM — Z90.13 HISTORY OF BILATERAL MASTECTOMY: Status: ACTIVE | Noted: 2024-10-07

## 2024-10-07 PROCEDURE — 3008F BODY MASS INDEX DOCD: CPT | Performed by: FAMILY MEDICINE

## 2024-10-07 PROCEDURE — 3046F HEMOGLOBIN A1C LEVEL >9.0%: CPT | Performed by: FAMILY MEDICINE

## 2024-10-07 PROCEDURE — 3050F LDL-C >= 130 MG/DL: CPT | Performed by: FAMILY MEDICINE

## 2024-10-07 PROCEDURE — 3078F DIAST BP <80 MM HG: CPT | Performed by: FAMILY MEDICINE

## 2024-10-07 PROCEDURE — 99214 OFFICE O/P EST MOD 30 MIN: CPT | Performed by: FAMILY MEDICINE

## 2024-10-07 PROCEDURE — 3074F SYST BP LT 130 MM HG: CPT | Performed by: FAMILY MEDICINE

## 2024-10-07 PROCEDURE — 1036F TOBACCO NON-USER: CPT | Performed by: FAMILY MEDICINE

## 2024-10-07 RX ORDER — INSULIN GLARGINE 300 U/ML
40 INJECTION, SOLUTION SUBCUTANEOUS NIGHTLY
Qty: 4.5 ML | Refills: 11 | Status: SHIPPED | OUTPATIENT
Start: 2024-10-07 | End: 2025-11-16

## 2024-10-07 RX ORDER — MICONAZOLE NITRATE 2 %
1 CREAM WITH APPLICATOR VAGINAL NIGHTLY
Qty: 45 G | Refills: 0 | Status: SHIPPED | OUTPATIENT
Start: 2024-10-07 | End: 2024-10-14

## 2024-10-07 RX ORDER — BLOOD-GLUCOSE SENSOR
EACH MISCELLANEOUS
Qty: 2 EACH | Refills: 11 | Status: SHIPPED | OUTPATIENT
Start: 2024-10-07

## 2024-10-07 RX ORDER — FLUCONAZOLE 150 MG/1
150 TABLET ORAL
Qty: 3 TABLET | Refills: 0 | Status: SHIPPED | OUTPATIENT
Start: 2024-10-07 | End: 2024-10-14

## 2024-10-07 RX ORDER — INSULIN GLARGINE 300 [IU]/ML
40 INJECTION, SOLUTION SUBCUTANEOUS NIGHTLY
Qty: 4.5 ML | Refills: 11 | Status: SHIPPED | OUTPATIENT
Start: 2024-10-07 | End: 2024-10-07 | Stop reason: SDUPTHER

## 2024-10-07 SDOH — ECONOMIC STABILITY: FOOD INSECURITY: WITHIN THE PAST 12 MONTHS, THE FOOD YOU BOUGHT JUST DIDN'T LAST AND YOU DIDN'T HAVE MONEY TO GET MORE.: NEVER TRUE

## 2024-10-07 SDOH — ECONOMIC STABILITY: FOOD INSECURITY: WITHIN THE PAST 12 MONTHS, YOU WORRIED THAT YOUR FOOD WOULD RUN OUT BEFORE YOU GOT MONEY TO BUY MORE.: NEVER TRUE

## 2024-10-07 ASSESSMENT — ENCOUNTER SYMPTOMS
LOSS OF SENSATION IN FEET: 1
DEPRESSION: 0
OCCASIONAL FEELINGS OF UNSTEADINESS: 0

## 2024-10-07 ASSESSMENT — PATIENT HEALTH QUESTIONNAIRE - PHQ9
SUM OF ALL RESPONSES TO PHQ9 QUESTIONS 1 & 2: 0
1. LITTLE INTEREST OR PLEASURE IN DOING THINGS: NOT AT ALL
2. FEELING DOWN, DEPRESSED OR HOPELESS: NOT AT ALL

## 2024-10-07 ASSESSMENT — LIFESTYLE VARIABLES
HOW OFTEN DO YOU HAVE SIX OR MORE DRINKS ON ONE OCCASION: NEVER
HOW MANY STANDARD DRINKS CONTAINING ALCOHOL DO YOU HAVE ON A TYPICAL DAY: PATIENT DOES NOT DRINK
AUDIT-C TOTAL SCORE: 0
SKIP TO QUESTIONS 9-10: 1
HOW OFTEN DO YOU HAVE A DRINK CONTAINING ALCOHOL: NEVER

## 2024-10-07 ASSESSMENT — PAIN SCALES - GENERAL: PAINLEVEL: 0-NO PAIN

## 2024-10-07 NOTE — ASSESSMENT & PLAN NOTE
Will start her on Toujeo 40 mg nightly (previously on Basaglar 80 units nightly but has been off insulin for over 6 months due to insurance coverage)  Referral to clinical pharmacy to help with insulin titration  Freestyle gt 3 applied in office  Orders:    insulin glargine (Toujeo SoloStar U-300 Insulin) 300 unit/mL (1.5 mL) injection; Inject 40 Units under the skin once daily at bedtime. Take as directed per insulin instructions.    FreeStyle Gt 3 Sensor device; Use to check insulin continuously for diabetes    Referral to Clinical Pharmacy; Future

## 2024-10-07 NOTE — PATIENT INSTRUCTIONS
Thank you for choosing [facility] for your healthcare.   As always if you have any questions or concerns please do not hesitate to call our office at [phone] or through [place].    Have a great day!  Leann Tidwell, DO

## 2024-10-07 NOTE — PROGRESS NOTES
Subjective   Patient ID: Anjali Chamorro is a 49 y.o. female who presents for Diabetes.  Diabetes  She presents for her follow-up diabetic visit. She has type 2 diabetes mellitus. Her disease course has been worsening. There are no hypoglycemic associated symptoms. Risk factors for coronary artery disease include tobacco exposure and diabetes mellitus. Current diabetic treatments: She is currently only on glipizide due to insurance denied Basaglar and she has had reactions to Lantus and Levemir. An ACE inhibitor/angiotensin II receptor blocker is not being taken.     She is concerned about current vaginal yeast infection.  She tried taking Diflucan but it did not resolve her symptoms.    Patient Care Team:  Leann Tidwell DO as PCP - General  Leann Tidwell DO as PCP - [employer] PCP    Current Outpatient Medications   Medication Sig Dispense Refill    desloratadine (Clarinex Reditab) 5 mg disintegrating tablet Take 1 tablet (5 mg) by mouth once daily. 30 tablet 11    FLUoxetine (PROzac) 20 mg capsule TAKE 1 CAPSULE BY MOUTH ONCE DAILY 30 capsule 11    glipiZIDE XL (Glucotrol XL) 10 mg 24 hr tablet Take 1 tablet (10 mg) by mouth 2 times a day. 180 tablet 3    meclizine (Antivert) 25 mg tablet Take 1 tablet (25 mg) by mouth 3 times a day as needed for dizziness. 30 tablet 11    ondansetron (Zofran) 4 mg tablet TAKE 1 TABLET BY MOUTH EVERY 8 HOURS AS NEEDED FOR NAUSEA 30 tablet 11    SUMAtriptan (Imitrex) 100 mg tablet TAKE 1 TABLET BY MOUTH ONCE DAILY FOR 30 DAYS, may repeat after 2 hours but no more than 200 mg in 24 hour period 9 tablet 11    topiramate (Topamax) 100 mg tablet TAKE 1 TABLET BY MOUTH AT BEDTIME FOR MIGRAINE prevention 30 tablet 11    fluconazole (Diflucan) 150 mg tablet Take 1 tablet (150 mg) by mouth every 3 days for 3 doses. 3 tablet 0    FreeStyle Gt 3 Sensor device Use to check insulin continuously for diabetes 2 each 11    insulin glargine (Toujeo SoloStar U-300 Insulin) 300  "unit/mL (1.5 mL) injection Inject 40 Units under the skin once daily at bedtime. Take as directed per insulin instructions. 4.5 mL 11    miconazole (Micotin) 2 % vaginal cream Insert 1 Application into the vagina once daily at bedtime for 7 days. 45 g 0     No current facility-administered medications for this visit.       Objective     Visit Vitals  /64 (BP Location: Left arm, Patient Position: Sitting, BP Cuff Size: Adult)   Pulse 74   Temp 36 °C (96.8 °F) (Temporal)   Resp 20   Ht 1.74 m (5' 8.5\")   Wt 77.1 kg (170 lb)   SpO2 99%   BMI 25.47 kg/m²   Smoking Status Never   BSA 1.93 m²        Constitutional: Well nourished, well developed, appears stated age  Eyes: no scleral icterus, no conjunctival injection  Respiratory: normal respiratory effort  Musculoskeletal: No gross deformities appreciated  Skin: Warm, dry.  Neurologic: Alert, CNs II-XII grossly intact..  Psych: Appropriate mood and affect.        Assessment & Plan  Uncontrolled type 2 diabetes mellitus with hyperglycemia  Will start her on Toujeo 40 mg nightly (previously on Basaglar 80 units nightly but has been off insulin for over 6 months due to insurance coverage)  Referral to clinical pharmacy to help with insulin titration  Freestyle gt 3 applied in office  Orders:    insulin glargine (Toujeo SoloStar U-300 Insulin) 300 unit/mL (1.5 mL) injection; Inject 40 Units under the skin once daily at bedtime. Take as directed per insulin instructions.    FreeStyle Gt 3 Sensor device; Use to check insulin continuously for diabetes    Referral to Clinical Pharmacy; Future    Candidiasis of vulva and vagina    Orders:    fluconazole (Diflucan) 150 mg tablet; Take 1 tablet (150 mg) by mouth every 3 days for 3 doses.    miconazole (Micotin) 2 % vaginal cream; Insert 1 Application into the vagina once daily at bedtime for 7 days.       All pertinent lab work and results were reviewed with patient.     Follow up 3 months.     Leann Tidwell DO"

## 2024-10-24 NOTE — PROGRESS NOTES
Pharmacist Clinic: Diabetes Management  Anjali Chamorro is a 49 y.o. female was referred to Clinical Pharmacy Team for diabetes management.   Referring Provider: Leann Tidwell DO  - Last visit with referring provider: 10/7/24    Subjective     HPI    Current Diabetes Pharmacotherapy:    - Glipizide XL 10 mg BID  - Toujeo Solostar U300 40 units HS    Social History:  Current diet:   - Never eats breakfast  - L: breakfast at lunch   - D: chili   - Tries not to buy sweets  - Diet soda    Current exercise:   - None    Eye exam for this year?: yes - 2024  Foot exam for this year?: no    Current monitoring regimen:   Patient is using: continuous glucose monitor  Type of CGM: Gt 3    Reported blood sugars:     30 day report:  > 250: 43%  High: 28%  Target: 29%  Low: 1%    Fasting: This morning was 58, 54 (October 24)   - Started couple days ago    2-Hours Post Prandial: 209     Any episodes of hypoglycemia? Yes past couple days  Last couple days have been going low in the morning     Adverse Effects: None    Objective     There were no vitals taken for this visit.    Allergies   Allergen Reactions    Prednisone Swelling    Aspirin Other     VOMITING    Famotidine Other     VOMITING    Insulin Aspart U-100 Other     VOMITING    Insulin Detemir Other    Insulin Glargine Nausea Only and Other     VOMITING    Metformin Other    Penicillins Hives and Angioedema    Pioglitazone Nausea Only and Other    Sodium Citrate-Citric Acid Other     VOMITING    Adhesive Tape-Silicones Rash    Chlorhexidine Rash    Povidone-Iodine Rash     TOPICAL APPLICATION       Historical Diabetes Pharmacotherapy:  - Lantus (reaction)  - Levemir (reaction)  - Basaglar denied by insurance  - Trulicity (constipation)  - Victoza  - Rybelsus  - Metformin (intolerance)    SECONDARY PREVENTION  - Statin? No   - ACE-I/ARB? No  - Aspirin? No    Pertinent PMH Review:  - PMH of Pancreatitis: Did not discuss  - PMH of Retinopathy: Did not discuss  - PMH of  Urinary Tract Infections: Did not discuss  - PMH of Yeast Infections: Yes  - PMH of MTC: Did not discuss    Lab Review  Lab Results   Component Value Date    BILITOT 0.6 10/02/2024    CALCIUM 8.4 (L) 10/02/2024    CO2 27 10/02/2024     10/02/2024    CREATININE 0.55 10/02/2024    GLUCOSE 255 (H) 10/02/2024    ALKPHOS 165 (H) 10/02/2024    K 4.3 10/02/2024    PROT 6.5 10/02/2024     10/02/2024    AST 30 10/02/2024    ALT 31 10/02/2024    BUN 17 10/02/2024    ANIONGAP 11 10/02/2024    ALBUMIN 4.0 10/02/2024    GFRF >90 04/26/2023     Lab Results   Component Value Date    TRIG 89 10/02/2024    CHOL 219 (H) 10/02/2024    HDL 48.9 10/02/2024     Lab Results   Component Value Date    HGBA1C 12.9 (H) 10/02/2024    HGBA1C 8.2 (A) 04/26/2023    HGBA1C 10.0 (A) 06/15/2022     The 10-year ASCVD risk score (Niurka MARQUEZ, et al., 2019) is: 2.1%    Values used to calculate the score:      Age: 49 years      Sex: Female      Is Non- : No      Diabetic: Yes      Tobacco smoker: No      Systolic Blood Pressure: 105 mmHg      Is BP treated: No      HDL Cholesterol: 48.9 mg/dL      Total Cholesterol: 219 mg/dL    Drug Interactions:  None    Affordability/Accessibility:  None with current regimen    Preferred Pharmacy:  [facility]    Assessment/Plan   Problem List Items Addressed This Visit       Uncontrolled type 2 diabetes mellitus with hyperglycemia    Relevant Orders    Referral to Clinical Pharmacy       ASSESSMENT:  Patients diabetes is uncontrolled with most recent A1c of 12.9%.     Patient referred for DM management and insulin titration. The past two days has had some lows in the morning, otherwise is running in 200's consistently. I will increase Toujeo to 44 units. I'd like to put her on Ozempic or Mounjaro but looks like it is a plan exclusion. Will consider other options next appointment. Educated for her to have a snack/protein before bed to help with morning  brett.    PLAN:  INCREASE Toujeo to 44 units   CONTINUE all other DM medications  Follow up with clinical pharmacist: 11/15/24 @ 9   Follow up with PCP: 1/7/25    Thank you,  Carolynn Porter, PharmD  Clinical Pharmacy Specialist  [phone]  [email or web address]     Continue all meds under the continuation of care with the referring provider and clinical pharmacy team.

## 2024-10-25 ENCOUNTER — APPOINTMENT (OUTPATIENT)
Dept: PHARMACY | Facility: HOSPITAL | Age: 49
End: 2024-10-25
Payer: COMMERCIAL

## 2024-10-25 DIAGNOSIS — E11.65 UNCONTROLLED TYPE 2 DIABETES MELLITUS WITH HYPERGLYCEMIA: ICD-10-CM

## 2024-10-30 DIAGNOSIS — H81.10 BENIGN PAROXYSMAL VERTIGO, UNSPECIFIED EAR: ICD-10-CM

## 2024-10-30 DIAGNOSIS — E11.65 UNCONTROLLED TYPE 2 DIABETES MELLITUS WITH HYPERGLYCEMIA: ICD-10-CM

## 2024-10-30 RX ORDER — MECLIZINE HYDROCHLORIDE 25 MG/1
TABLET ORAL
Qty: 30 TABLET | Refills: 11 | Status: SHIPPED | OUTPATIENT
Start: 2024-10-30

## 2024-10-30 RX ORDER — INSULIN GLARGINE 300 U/ML
40 INJECTION, SOLUTION SUBCUTANEOUS NIGHTLY
Qty: 4.5 ML | Refills: 11 | Status: SHIPPED | OUTPATIENT
Start: 2024-10-30 | End: 2025-12-09

## 2024-11-08 ENCOUNTER — TELEPHONE (OUTPATIENT)
Dept: PRIMARY CARE | Facility: CLINIC | Age: 49
End: 2024-11-08
Payer: COMMERCIAL

## 2024-11-08 DIAGNOSIS — E11.65 UNCONTROLLED TYPE 2 DIABETES MELLITUS WITH HYPERGLYCEMIA: ICD-10-CM

## 2024-11-08 RX ORDER — INSULIN GLARGINE 300 U/ML
44 INJECTION, SOLUTION SUBCUTANEOUS NIGHTLY
Qty: 4.5 ML | Refills: 11 | Status: SHIPPED | OUTPATIENT
Start: 2024-11-08 | End: 2025-12-18

## 2024-11-08 NOTE — TELEPHONE ENCOUNTER
Patient called and said that her insulin was increased and as of today she is going to be out of it.  She was suppose to receive the:      Toujeo SoloStar U-300 Insulin 300 unit/mL (1.5 mL) injection [[id number]]     Doses are suppose to be 44 units per the in house pharmacist.    Pharmacy:  [facility] [address] [place] [address]

## 2024-11-11 NOTE — PROGRESS NOTES
Pharmacist Clinic: Diabetes Management  Anjali Chamorro is a 49 y.o. female was referred to Clinical Pharmacy Team for diabetes management.   Referring Provider: Leann Tidwell, DO  - Last visit with referring provider: 10/7/24    Subjective     HPI    Current Diabetes Pharmacotherapy:    - Glipizide XL 10 mg BID  - Toujeo Solostar U300 44 units HS    Social History:  Current diet:   - Never eats breakfast  - L: breakfast at lunch   - D: chili   - Tries not to buy sweets  - Diet soda    Current exercise:  - None    Eye exam for this year?: yes - 2024  Foot exam for this year?: no    Current monitoring regimen:   Patient is using: continuous glucose monitor  Type of CGM: Gt 3    Reported blood sugars:   See APG Report Below    Any episodes of hypoglycemia? Yes  - Some lows around 4 am and 11 am    Adverse Effects: None      Objective     There were no vitals taken for this visit.    Allergies   Allergen Reactions    Prednisone Swelling    Aspirin Other     VOMITING    Famotidine Other     VOMITING    Insulin Aspart U-100 Other     VOMITING    Insulin Detemir Other    Insulin Glargine Nausea Only and Other     VOMITING    Metformin Other    Penicillins Hives and Angioedema    Pioglitazone Nausea Only and Other    Sodium Citrate-Citric Acid Other     VOMITING    Adhesive Tape-Silicones Rash    Chlorhexidine Rash    Povidone-Iodine Rash     TOPICAL APPLICATION       Historical Diabetes Pharmacotherapy:  - Lantus (reaction)  - Levemir (reaction)  - Basaglar denied by insurance  - Trulicity (feet/legs to swell/constipation)  - Victoza  - Rybelsus  - Metformin (intolerance)  - Jardiance     SECONDARY PREVENTION  - Statin? No   - ACE-I/ARB? No  - Aspirin? No    Pertinent PMH Review:  - PMH of Pancreatitis: No  - PMH of Retinopathy: No  - PMH of Urinary Tract Infections: Yes  - PMH of Yeast Infections: Yes  - PMH of MTC: No    Lab Review  Lab Results   Component Value Date    BILITOT 0.6 10/02/2024    CALCIUM 8.4 (L)  10/02/2024    CO2 27 10/02/2024     10/02/2024    CREATININE 0.55 10/02/2024    GLUCOSE 255 (H) 10/02/2024    ALKPHOS 165 (H) 10/02/2024    K 4.3 10/02/2024    PROT 6.5 10/02/2024     10/02/2024    AST 30 10/02/2024    ALT 31 10/02/2024    BUN 17 10/02/2024    ANIONGAP 11 10/02/2024    ALBUMIN 4.0 10/02/2024    GFRF >90 04/26/2023     Lab Results   Component Value Date    TRIG 89 10/02/2024    CHOL 219 (H) 10/02/2024    HDL 48.9 10/02/2024     Lab Results   Component Value Date    HGBA1C 12.9 (H) 10/02/2024    HGBA1C 8.2 (A) 04/26/2023    HGBA1C 10.0 (A) 06/15/2022     The 10-year ASCVD risk score (Niurka MARQUEZ, et al., 2019) is: 2.1%    Values used to calculate the score:      Age: 49 years      Sex: Female      Is Non- : No      Diabetic: Yes      Tobacco smoker: No      Systolic Blood Pressure: 105 mmHg      Is BP treated: No      HDL Cholesterol: 48.9 mg/dL      Total Cholesterol: 219 mg/dL    Drug Interactions:  None    Affordability/Accessibility:  None with current regimen    Preferred Pharmacy:  [facility]    Assessment/Plan   Problem List Items Addressed This Visit       Uncontrolled type 2 diabetes mellitus with hyperglycemia    Relevant Medications    tirzepatide (Mounjaro) 2.5 mg/0.5 mL pen injector    glipiZIDE (Glucotrol) 5 mg tablet    Other Relevant Orders    Referral to Clinical Pharmacy     ASSESSMENT:  Patients diabetes is uncontrolled with most recent A1c of 12.9%.     Patient still having some post prandial highs. There are a few lows around 11 am. She is unsure what is causing it since she takes her glipizide with breakfast. Due to these highs, we will start her on Mounjaro 2.5 mg weekly. I will switch Glipizide XL to IR. Will also decrease breakfast dose to 5 mg. She is to keep everything else the same.    PLAN:  START Mounjaro 2.5 mg weekly   CHANGE Glipizide XL to Glipizide IR 5 mg with breakfast and 10 mg with dinner  CONTINUE Delio 44 units     Follow up with clinical pharmacist: 12/13/24 @ 9:20   Follow up with PCP: 1/7/25    Thank you,  Carolynn Porter, PharmD  Clinical Pharmacy Specialist  [phone]  [email or web address]     Continue all meds under the continuation of care with the referring provider and clinical pharmacy team.

## 2024-11-15 ENCOUNTER — APPOINTMENT (OUTPATIENT)
Dept: PHARMACY | Facility: HOSPITAL | Age: 49
End: 2024-11-15
Payer: COMMERCIAL

## 2024-11-15 DIAGNOSIS — E11.65 UNCONTROLLED TYPE 2 DIABETES MELLITUS WITH HYPERGLYCEMIA: ICD-10-CM

## 2024-11-15 RX ORDER — TIRZEPATIDE 2.5 MG/.5ML
2.5 INJECTION, SOLUTION SUBCUTANEOUS
Qty: 2 ML | Refills: 1 | Status: SHIPPED | OUTPATIENT
Start: 2024-11-15

## 2024-11-15 RX ORDER — GLIPIZIDE 5 MG/1
TABLET ORAL
Qty: 90 TABLET | Refills: 4 | Status: SHIPPED | OUTPATIENT
Start: 2024-11-15

## 2024-11-19 ENCOUNTER — TELEPHONE (OUTPATIENT)
Dept: PHARMACY | Facility: HOSPITAL | Age: 49
End: 2024-11-19
Payer: COMMERCIAL

## 2024-11-19 DIAGNOSIS — E11.65 UNCONTROLLED TYPE 2 DIABETES MELLITUS WITH HYPERGLYCEMIA: ICD-10-CM

## 2024-11-19 RX ORDER — INSULIN GLARGINE 300 U/ML
47 INJECTION, SOLUTION SUBCUTANEOUS EVERY MORNING
Qty: 15 ML | Refills: 5 | Status: SHIPPED | OUTPATIENT
Start: 2024-11-19 | End: 2026-06-16

## 2024-11-19 NOTE — TELEPHONE ENCOUNTER
Mounjaro PA got denied. Called patient to let her know. Instead we will move Toujeo to the morning and increase from 44 units to 47 units to help with highs.    May have to make additional adjustments next visit.    Problem List Items Addressed This Visit       Uncontrolled type 2 diabetes mellitus with hyperglycemia    Relevant Medications    Toujeo SoloStar U-300 Insulin 300 unit/mL (1.5 mL) injection        Carolynn Porter, PharmD  Clinical Pharmacy Specialist - Primary Care  [phone]  [email or web address]

## 2024-11-27 ENCOUNTER — TELEPHONE (OUTPATIENT)
Dept: PHARMACY | Facility: HOSPITAL | Age: 49
End: 2024-11-27
Payer: COMMERCIAL

## 2024-11-27 NOTE — TELEPHONE ENCOUNTER
Patient called stating she spoke with insurance and wanted to give me a heads up. They will not cover other medications such as Mounjaro or Rybelsus due to her not trialing them. She let them know that she has an allergy to them but they still will not cover unless she tries them first. I let her know that we will just continue her on her current regimen.    She has started to split her insulin in half to 30 units in the morning and 17 units at night and states this has been helping. We will review all of this at our next appt.    Carolynn Porter, PharmD  Clinical Pharmacy Specialist - Primary Care  [phone]  [email or web address]

## 2024-12-13 ENCOUNTER — APPOINTMENT (OUTPATIENT)
Dept: PHARMACY | Facility: HOSPITAL | Age: 49
End: 2024-12-13
Payer: COMMERCIAL

## 2024-12-13 DIAGNOSIS — E11.65 UNCONTROLLED TYPE 2 DIABETES MELLITUS WITH HYPERGLYCEMIA: ICD-10-CM

## 2024-12-13 RX ORDER — INSULIN LISPRO 100 [IU]/ML
8 INJECTION, SOLUTION INTRAVENOUS; SUBCUTANEOUS
Qty: 15 ML | Refills: 1 | Status: SHIPPED | OUTPATIENT
Start: 2024-12-13

## 2024-12-13 NOTE — PROGRESS NOTES
Pharmacist Clinic: Diabetes Management  Anjali Chamorro is a 49 y.o. female was referred to Clinical Pharmacy Team for diabetes management.   Referring Provider: Leann Tidwell, DO  - Last visit with referring provider: 10/7/24    Subjective     HPI    Current Diabetes Pharmacotherapy:    - Glipizide XL 5 mg QAM and 10 mg HS  - Toujeo Solostar U300 30 units in the morning and 17 units at night     Social History:  Current diet:   - Never eats breakfast  - L: breakfast at lunch   - D: chili   - Tries not to buy sweets  - Diet soda    Current exercise:  - None    Eye exam for this year?: yes - 2024  Foot exam for this year?: no    Current monitoring regimen:   Patient is using: continuous glucose monitor  Type of CGM: Gt 3    Reported blood sugars:   See APG Report Below    Any episodes of hypoglycemia? Yes  - Some lows around 4 am and 11 am    Adverse Effects: None          Objective     There were no vitals taken for this visit.    Allergies   Allergen Reactions    Prednisone Swelling    Aspirin Other     VOMITING    Famotidine Other     VOMITING    Insulin Aspart U-100 Other     VOMITING    Insulin Detemir Other    Insulin Glargine Nausea Only and Other     VOMITING    Metformin Other    Penicillins Hives and Angioedema    Pioglitazone Nausea Only and Other    Sodium Citrate-Citric Acid Other     VOMITING    Adhesive Tape-Silicones Rash    Chlorhexidine Rash    Povidone-Iodine Rash     TOPICAL APPLICATION       Historical Diabetes Pharmacotherapy:  - Lantus (reaction)  - Levemir (reaction)  - Basaglar denied by insurance  - Trulicity (feet/legs to swell/constipation)  - Victoza  - Rybelsus  - Metformin (intolerance)  - Jardiance     SECONDARY PREVENTION  - Statin? No   - ACE-I/ARB? No  - Aspirin? No    Pertinent PMH Review:  - PMH of Pancreatitis: No  - PMH of Retinopathy: No  - PMH of Urinary Tract Infections: Yes  - PMH of Yeast Infections: Yes  - PMH of MTC: No    Lab Review  Lab Results   Component Value  Date    BILITOT 0.6 10/02/2024    CALCIUM 8.4 (L) 10/02/2024    CO2 27 10/02/2024     10/02/2024    CREATININE 0.55 10/02/2024    GLUCOSE 255 (H) 10/02/2024    ALKPHOS 165 (H) 10/02/2024    K 4.3 10/02/2024    PROT 6.5 10/02/2024     10/02/2024    AST 30 10/02/2024    ALT 31 10/02/2024    BUN 17 10/02/2024    ANIONGAP 11 10/02/2024    ALBUMIN 4.0 10/02/2024    GFRF >90 04/26/2023     Lab Results   Component Value Date    TRIG 89 10/02/2024    CHOL 219 (H) 10/02/2024    HDL 48.9 10/02/2024     Lab Results   Component Value Date    HGBA1C 12.9 (H) 10/02/2024    HGBA1C 8.2 (A) 04/26/2023    HGBA1C 10.0 (A) 06/15/2022     The 10-year ASCVD risk score (Niurka MARQUEZ, et al., 2019) is: 2.1%    Values used to calculate the score:      Age: 49 years      Sex: Female      Is Non- : No      Diabetic: Yes      Tobacco smoker: No      Systolic Blood Pressure: 105 mmHg      Is BP treated: No      HDL Cholesterol: 48.9 mg/dL      Total Cholesterol: 219 mg/dL    Drug Interactions:  None    Affordability/Accessibility:  None with current regimen    Preferred Pharmacy:  [facility]    Assessment/Plan   Problem List Items Addressed This Visit       Uncontrolled type 2 diabetes mellitus with hyperglycemia    Relevant Medications    insulin lispro (HumaLOG KwikPen Insulin) 100 unit/mL injection    Other Relevant Orders    Referral to Clinical Pharmacy     ASSESSMENT:  Patients diabetes is uncontrolled with most recent A1c of 12.9%.     Patient running almost consistently high. I do not think Glipizide is working for her anymore. Therefore, we will trial Humalog (tried Novolog in the past and had vomiting; she has not tried Humalog before).  Will have her inject 8 units TID with meals.     PLAN:  START Humalog 8 units TID   CONTINUE all other DM medications  Follow up with clinical pharmacist: 1/10/25 @ 9:20   Follow up with PCP: 1/7/25    Thank you,  Carolynn Porter, PharmD  Clinical Pharmacy  Specialist  [phone]  [email or web address]     Continue all meds under the continuation of care with the referring provider and clinical pharmacy team.

## 2024-12-16 ENCOUNTER — TELEPHONE (OUTPATIENT)
Dept: PHARMACY | Facility: HOSPITAL | Age: 49
End: 2024-12-16
Payer: COMMERCIAL

## 2024-12-16 NOTE — TELEPHONE ENCOUNTER
Patient started on steroid for pulled muscle in back. She started on Saturday and blood sugars have been around 400. She did not know that steroids can have this effect but I explained to her. She plans to stop the steroid and continue on the muscle relaxer.    To bring down the blood sugars as the steroids wear off, will increase Tresiba to 34 units in the morning and 20 units at night. After a few days when blood sugars go back down, she is to return back to her normal regimen.    Carolynn Porter, PharmD  Clinical Pharmacy Specialist - Primary Care  [phone]  [email or web address]

## 2024-12-24 RX ORDER — BACLOFEN 10 MG/1
10 TABLET ORAL 3 TIMES DAILY PRN
COMMUNITY
Start: 2024-12-14

## 2024-12-24 NOTE — PATIENT INSTRUCTIONS
Thank you for choosing [facility] for your healthcare.   As always if you have any questions or concerns please do not hesitate to call our office at [phone] or through [employer].    Have a great day!  Leann Tidwell, DO

## 2024-12-31 ENCOUNTER — LAB (OUTPATIENT)
Dept: LAB | Facility: LAB | Age: 49
End: 2024-12-31
Payer: COMMERCIAL

## 2024-12-31 ENCOUNTER — TELEPHONE (OUTPATIENT)
Dept: PRIMARY CARE | Facility: CLINIC | Age: 49
End: 2024-12-31

## 2024-12-31 ENCOUNTER — APPOINTMENT (OUTPATIENT)
Dept: PRIMARY CARE | Facility: CLINIC | Age: 49
End: 2024-12-31
Payer: COMMERCIAL

## 2024-12-31 VITALS
TEMPERATURE: 96.8 F | BODY MASS INDEX: 27.11 KG/M2 | SYSTOLIC BLOOD PRESSURE: 101 MMHG | DIASTOLIC BLOOD PRESSURE: 67 MMHG | RESPIRATION RATE: 20 BRPM | HEIGHT: 69 IN | HEART RATE: 78 BPM | WEIGHT: 183 LBS | OXYGEN SATURATION: 99 %

## 2024-12-31 DIAGNOSIS — E11.65 UNCONTROLLED TYPE 2 DIABETES MELLITUS WITH HYPERGLYCEMIA: ICD-10-CM

## 2024-12-31 DIAGNOSIS — Z00.00 ANNUAL PHYSICAL EXAM: Primary | ICD-10-CM

## 2024-12-31 DIAGNOSIS — G43.009 MIGRAINE WITHOUT AURA AND WITHOUT STATUS MIGRAINOSUS, NOT INTRACTABLE: ICD-10-CM

## 2024-12-31 LAB
ALBUMIN SERPL BCP-MCNC: 4.2 G/DL (ref 3.4–5)
ALP SERPL-CCNC: 117 U/L (ref 33–110)
ALT SERPL W P-5'-P-CCNC: 33 U/L (ref 7–45)
ANION GAP SERPL CALC-SCNC: 12 MMOL/L (ref 10–20)
AST SERPL W P-5'-P-CCNC: 25 U/L (ref 9–39)
BILIRUB SERPL-MCNC: 1.1 MG/DL (ref 0–1.2)
BUN SERPL-MCNC: 20 MG/DL (ref 6–23)
CALCIUM SERPL-MCNC: 9 MG/DL (ref 8.6–10.3)
CHLORIDE SERPL-SCNC: 102 MMOL/L (ref 98–107)
CO2 SERPL-SCNC: 29 MMOL/L (ref 21–32)
CREAT SERPL-MCNC: 0.61 MG/DL (ref 0.5–1.05)
CREAT UR-MCNC: 163.5 MG/DL (ref 20–320)
EGFRCR SERPLBLD CKD-EPI 2021: >90 ML/MIN/1.73M*2
EST. AVERAGE GLUCOSE BLD GHB EST-MCNC: 200 MG/DL
GLUCOSE SERPL-MCNC: 116 MG/DL (ref 74–99)
HBA1C MFR BLD: 8.6 %
MICROALBUMIN UR-MCNC: 9.8 MG/L
MICROALBUMIN/CREAT UR: 6 UG/MG CREAT
POTASSIUM SERPL-SCNC: 4.1 MMOL/L (ref 3.5–5.3)
PROT SERPL-MCNC: 6.5 G/DL (ref 6.4–8.2)
SODIUM SERPL-SCNC: 139 MMOL/L (ref 136–145)

## 2024-12-31 PROCEDURE — 82570 ASSAY OF URINE CREATININE: CPT

## 2024-12-31 PROCEDURE — 3008F BODY MASS INDEX DOCD: CPT | Performed by: FAMILY MEDICINE

## 2024-12-31 PROCEDURE — 82043 UR ALBUMIN QUANTITATIVE: CPT

## 2024-12-31 PROCEDURE — 83036 HEMOGLOBIN GLYCOSYLATED A1C: CPT

## 2024-12-31 PROCEDURE — 80053 COMPREHEN METABOLIC PANEL: CPT

## 2024-12-31 PROCEDURE — 3046F HEMOGLOBIN A1C LEVEL >9.0%: CPT | Performed by: FAMILY MEDICINE

## 2024-12-31 PROCEDURE — 3050F LDL-C >= 130 MG/DL: CPT | Performed by: FAMILY MEDICINE

## 2024-12-31 PROCEDURE — 3074F SYST BP LT 130 MM HG: CPT | Performed by: FAMILY MEDICINE

## 2024-12-31 PROCEDURE — 99396 PREV VISIT EST AGE 40-64: CPT | Performed by: FAMILY MEDICINE

## 2024-12-31 PROCEDURE — 3078F DIAST BP <80 MM HG: CPT | Performed by: FAMILY MEDICINE

## 2024-12-31 PROCEDURE — 1036F TOBACCO NON-USER: CPT | Performed by: FAMILY MEDICINE

## 2024-12-31 RX ORDER — ERENUMAB-AOOE 70 MG/ML
70 INJECTION SUBCUTANEOUS
Qty: 1 ML | Refills: 11 | Status: SHIPPED | OUTPATIENT
Start: 2024-12-31

## 2024-12-31 ASSESSMENT — PAIN SCALES - GENERAL: PAINLEVEL_OUTOF10: 0-NO PAIN

## 2024-12-31 NOTE — ASSESSMENT & PLAN NOTE
Improving per CGM A1C - 8.7%  Continue working with clinical pharmacist   Currently on basal and meal time insulin, she takes 16 units instead of 8 units if meal is carb heavy  Orders:    Comprehensive Metabolic Panel; Future    Hemoglobin A1C; Future    Albumin-Creatinine Ratio, Urine Random; Future

## 2024-12-31 NOTE — ASSESSMENT & PLAN NOTE
Topiramate not effective, will have her try Aimovig  Currently has 2-3 migraines per month which usually last 3 days each.   Orders:    erenumab (Aimovig Autoinjector) 70 mg/mL injection; Inject 1 mL (70 mg) under the skin every 28 (twenty-eight) days.

## 2024-12-31 NOTE — TELEPHONE ENCOUNTER
Talked with pt and gave Dr Tidwell's message.  Pt said ok but her concern she was having is her bilirubin result was high.  She wanted to know what she can do to help that come down to a normal level.

## 2024-12-31 NOTE — TELEPHONE ENCOUNTER
Please let her know I will send her a [employer] message once I have had a chance to review her results. This may not happen today. Thanks,  Leann Tidwell, DO

## 2024-12-31 NOTE — TELEPHONE ENCOUNTER
Her bilirubin level was in the normal range.  The bilirubin is managed by the liver and colon so there is not anything that she can do to affect this level. Thanks,  Leann Tidwell, DO

## 2024-12-31 NOTE — PROGRESS NOTES
Subjective   Patient ID: Anjali Chamorro is a 49 y.o. female who presents for Annual Exam (Declines POCT A1C/).  Diabetes  She presents for her follow-up diabetic visit. She has type 2 diabetes mellitus. Her disease course has been improving. Risk factors for coronary artery disease include diabetes mellitus. Current diabetic treatment includes insulin injections. Her weight is stable. She is following a generally unhealthy diet. An ACE inhibitor/angiotensin II receptor blocker is not being taken.         Patient Care Team:  Leann Tidwell DO as PCP - General  Leann Tidwell DO as PCP - [facility] PCP  Leann Tidwell DO as PCP - [facility] Medicaid PCP  Carolynn Porter, PharmD as Pharmacist (Pharmacy)    Current Outpatient Medications   Medication Sig Dispense Refill    baclofen (Lioresal) 10 mg tablet Take 1 tablet (10 mg) by mouth 3 times a day as needed for muscle spasms.      desloratadine (Clarinex Reditab) 5 mg disintegrating tablet Take 1 tablet (5 mg) by mouth once daily. 30 tablet 11    FreeStyle Gt 3 Sensor device Use to check insulin continuously for diabetes 2 each 11    insulin lispro (HumaLOG KwikPen Insulin) 100 unit/mL injection Inject 8 Units under the skin 3 times daily (morning, midday, late afternoon). Take as directed per insulin instructions. 15 mL 1    meclizine (Antivert) 25 mg tablet TAKE 1 TABLET (25 mg) BY MOUTH THREE TIMES DAILY AS NEEDED FOR DIZZINESS 30 tablet 11    ondansetron (Zofran) 4 mg tablet TAKE 1 TABLET BY MOUTH EVERY 8 HOURS AS NEEDED FOR NAUSEA 30 tablet 11    Toujeo SoloStar U-300 Insulin 300 unit/mL (1.5 mL) injection Inject 47 Units under the skin once daily in the morning. 15 mL 5    erenumab (Aimovig Autoinjector) 70 mg/mL injection Inject 1 mL (70 mg) under the skin every 28 (twenty-eight) days. 1 mL 11     No current facility-administered medications for this visit.       Objective     Visit Vitals  /67 (BP Location: Left arm, Patient Position:  "Sitting, BP Cuff Size: Adult)   Pulse 78   Temp 36 °C (96.8 °F) (Temporal)   Resp 20   Ht 1.74 m (5' 8.5\")   Wt 83 kg (183 lb)   SpO2 99%   BMI 27.42 kg/m²   Smoking Status Never   BSA 2 m²        Constitutional: Well nourished, well developed, appears stated age  Eyes: no scleral icterus, no conjunctival injection  Ears: normal external auditory canal, no retraction or bulging of tympanic membranes  Neck: no thyromegaly  Cardiovascular: regular rate and rhythm, no leg edema  Respiratory: normal respiratory effort, clear to auscultation bilaterally  Musculoskeletal: No gross deformities appreciated  Skin: Warm, dry. No rashes  Neurologic: Alert, CNs II-XII grossly intact..  Psych: Appropriate mood and affect.        Assessment & Plan  Annual physical exam  PAP done by gyn 2022  Hx of bilateral mastectomy with implants, mammogram not indicated  Colonoscopy done 2013       Uncontrolled type 2 diabetes mellitus with hyperglycemia  Improving per CGM A1C - 8.7%  Continue working with clinical pharmacist   Currently on basal and meal time insulin, she takes 16 units instead of 8 units if meal is carb heavy  Orders:    Comprehensive Metabolic Panel; Future    Hemoglobin A1C; Future    Albumin-Creatinine Ratio, Urine Random; Future    Migraine without aura and without status migrainosus, not intractable  Topiramate not effective, will have her try Aimovig  Currently has 2-3 migraines per month which usually last 3 days each.   Orders:    erenumab (Aimovig Autoinjector) 70 mg/mL injection; Inject 1 mL (70 mg) under the skin every 28 (twenty-eight) days.         Follow up 6 months.     Leann Tidwell, DO  "

## 2025-01-05 ENCOUNTER — PATIENT MESSAGE (OUTPATIENT)
Dept: PRIMARY CARE | Facility: CLINIC | Age: 50
End: 2025-01-05
Payer: COMMERCIAL

## 2025-01-05 DIAGNOSIS — G43.009 MIGRAINE WITHOUT AURA AND WITHOUT STATUS MIGRAINOSUS, NOT INTRACTABLE: Primary | ICD-10-CM

## 2025-01-06 RX ORDER — ZONISAMIDE 25 MG/1
25 CAPSULE ORAL NIGHTLY
Qty: 30 CAPSULE | Refills: 11 | Status: SHIPPED | OUTPATIENT
Start: 2025-01-06 | End: 2026-01-06

## 2025-01-07 ENCOUNTER — APPOINTMENT (OUTPATIENT)
Dept: PRIMARY CARE | Facility: CLINIC | Age: 50
End: 2025-01-07
Payer: COMMERCIAL

## 2025-01-09 NOTE — PROGRESS NOTES
Pharmacist Clinic: Diabetes Management  Anjali Chamorro is a 49 y.o. female was referred to Clinical Pharmacy Team for diabetes management.   Referring Provider: Leann Tidwell DO  - Last visit with referring provider: 1/7/25    Subjective     HPI    Current Diabetes Pharmacotherapy:    - Humalog 8 units TID AC   - Takes 16 units if meal is carb heavy  - Toujeo Solostar U300 30 units in the morning and 17 units at night     Social History:  Current diet:   - Never eats breakfast  - L: breakfast at lunch   - D: chili   - Tries not to buy sweets  - Diet soda    Current exercise:  - None    Eye exam for this year?: yes - 2024  Foot exam for this year?: no    Current monitoring regimen:   Patient is using: continuous glucose monitor  Type of CGM: Gt 3    Reported blood sugars:   See APG Report Below  - 1/8 started up on insulin     Any episodes of hypoglycemia? Yes  - Some lows here and there    Adverse Effects: None              Objective     There were no vitals taken for this visit.    Allergies   Allergen Reactions    Prednisone Swelling    Aspirin Other     VOMITING    Famotidine Other     VOMITING    Insulin Aspart U-100 Other     VOMITING    Insulin Detemir Other    Insulin Glargine Nausea Only and Other     VOMITING    Metformin Other    Penicillins Hives and Angioedema    Pioglitazone Nausea Only and Other    Sodium Citrate-Citric Acid Other     VOMITING    Adhesive Tape-Silicones Rash    Chlorhexidine Rash    Povidone-Iodine Rash     TOPICAL APPLICATION       Historical Diabetes Pharmacotherapy:  - Lantus (reaction)  - Levemir (reaction)  - Basaglar denied by insurance  - Trulicity (feet/legs to swell/constipation)  - Victoza  - Rybelsus  - Metformin (intolerance)  - Jardiance     SECONDARY PREVENTION  - Statin? No   - ACE-I/ARB? No  - Aspirin? No    Pertinent PMH Review:  - PMH of Pancreatitis: No  - PMH of Retinopathy: No  - PMH of Urinary Tract Infections: Yes  - PMH of Yeast Infections: Yes  - PMH  of MTC: No    Lab Review  Lab Results   Component Value Date    BILITOT 1.1 12/31/2024    CALCIUM 9.0 12/31/2024    CO2 29 12/31/2024     12/31/2024    CREATININE 0.61 12/31/2024    GLUCOSE 116 (H) 12/31/2024    ALKPHOS 117 (H) 12/31/2024    K 4.1 12/31/2024    PROT 6.5 12/31/2024     12/31/2024    AST 25 12/31/2024    ALT 33 12/31/2024    BUN 20 12/31/2024    ANIONGAP 12 12/31/2024    ALBUMIN 4.2 12/31/2024    GFRF >90 04/26/2023     Lab Results   Component Value Date    TRIG 89 10/02/2024    CHOL 219 (H) 10/02/2024    HDL 48.9 10/02/2024     Lab Results   Component Value Date    HGBA1C 8.6 (H) 12/31/2024    HGBA1C 12.9 (H) 10/02/2024    HGBA1C 8.2 (A) 04/26/2023     The 10-year ASCVD risk score (Niurka MARQUEZ, et al., 2019) is: 2%    Values used to calculate the score:      Age: 49 years      Sex: Female      Is Non- : No      Diabetic: Yes      Tobacco smoker: No      Systolic Blood Pressure: 101 mmHg      Is BP treated: No      HDL Cholesterol: 48.9 mg/dL      Total Cholesterol: 219 mg/dL    Drug Interactions:  None    Affordability/Accessibility:  None with current regimen    Preferred Pharmacy:  [facility]    Assessment/Plan   Problem List Items Addressed This Visit       Uncontrolled type 2 diabetes mellitus with hyperglycemia       ASSESSMENT:  Patients diabetes is uncontrolled with most recent A1c of 8.6%.     Patient's blood sugars slowly getting better. Still having highs throughout the day. She has been using 16 units with carb heavy meals. Therefore will have take 8 units with bfast, 16 units with lunch and dinner. Will also increase Toujeo a bit to 33 units in the morning and 20 units at bedtime. She admits to snacking all day which we discussed to monitor and watch which types of foods she is snacking on.    PLAN:  INCREASE Humalog to 8 units with breakfast, 16 units with lunch and dinner  INCREASE Toujeo to 33 units in the morning and 20 units at bedtime    CONTINUE all other DM medications  Follow up with clinical pharmacist: 2/7/25 @ 9:20  Follow up with PCP: 7/24/25    Thank you,  Carolynn Porter, PharmD  Clinical Pharmacy Specialist  [phone]  [email or web address]     Continue all meds under the continuation of care with the referring provider and clinical pharmacy team.

## 2025-01-10 ENCOUNTER — APPOINTMENT (OUTPATIENT)
Dept: PHARMACY | Facility: HOSPITAL | Age: 50
End: 2025-01-10
Payer: COMMERCIAL

## 2025-01-10 DIAGNOSIS — E11.65 UNCONTROLLED TYPE 2 DIABETES MELLITUS WITH HYPERGLYCEMIA: ICD-10-CM

## 2025-01-20 DIAGNOSIS — E11.65 UNCONTROLLED TYPE 2 DIABETES MELLITUS WITH HYPERGLYCEMIA: ICD-10-CM

## 2025-01-20 RX ORDER — INSULIN GLARGINE 300 U/ML
INJECTION, SOLUTION SUBCUTANEOUS
Qty: 15 ML | Refills: 5 | Status: SHIPPED | OUTPATIENT
Start: 2025-01-20

## 2025-01-20 RX ORDER — INSULIN LISPRO 100 [IU]/ML
INJECTION, SOLUTION INTRAVENOUS; SUBCUTANEOUS
Qty: 15 ML | Refills: 3 | Status: SHIPPED | OUTPATIENT
Start: 2025-01-20

## 2025-01-20 NOTE — PROGRESS NOTES
Patient called and stated pharmacy needed updated script with correct units to fill. [facility] She has been doing 30 units at bedtime of Toujeo instead of 20 units. Will have to continue with 30 units after taking a look at polly report.     Problem List Items Addressed This Visit       Uncontrolled type 2 diabetes mellitus with hyperglycemia    Relevant Medications    Toujeo SoloStar U-300 Insulin 300 unit/mL (1.5 mL) injection    insulin lispro (HumaLOG KwikPen Insulin) 100 unit/mL injection        Carolynn Porter, PharmD  Clinical Pharmacy Specialist - Primary Care  [phone]  [email or web address]

## 2025-02-07 ENCOUNTER — APPOINTMENT (OUTPATIENT)
Dept: PHARMACY | Facility: HOSPITAL | Age: 50
End: 2025-02-07
Payer: COMMERCIAL

## 2025-02-07 DIAGNOSIS — E11.65 UNCONTROLLED TYPE 2 DIABETES MELLITUS WITH HYPERGLYCEMIA: ICD-10-CM

## 2025-02-07 RX ORDER — INSULIN LISPRO 100 [IU]/ML
INJECTION, SOLUTION INTRAVENOUS; SUBCUTANEOUS
Qty: 15 ML | Refills: 3 | Status: SHIPPED | OUTPATIENT
Start: 2025-02-07

## 2025-02-07 RX ORDER — INSULIN GLARGINE 300 U/ML
INJECTION, SOLUTION SUBCUTANEOUS
Qty: 15 ML | Refills: 5 | Status: SHIPPED | OUTPATIENT
Start: 2025-02-07

## 2025-02-07 NOTE — PROGRESS NOTES
Pharmacist Clinic: Diabetes Management  Anjali Chamorro is a 49 y.o. female was referred to Clinical Pharmacy Team for diabetes management.   Referring Provider: Leann Tidwell DO  - Last visit with referring provider: 1/7/25    Subjective     HPI    Current Diabetes Pharmacotherapy:    - Humalog 8 units with breakfast, 16 units with lunch and dinner   - admits she missed a dose 3 x this week of dinner dose  - Toujeo Solostar U300 33 units in the morning and 20 units at bedtime   - admits she missed a dose 3 x this week of bedtime   dose    Social History:  Current diet:   - Never eats breakfast  - L: breakfast at lunch   - D: chili   - Tries not to buy sweets  - Diet soda    Current exercise:  - None    Eye exam for this year?: yes - 2024  Foot exam for this year?: no    Current monitoring regimen:   Patient is using: continuous glucose monitor  Type of CGM: Gt 3    Reported blood sugars:   See APG Report Below  - had donuts this week    Any episodes of hypoglycemia? No    Adverse Effects: None              Objective     There were no vitals taken for this visit.    Allergies   Allergen Reactions    Prednisone Swelling    Aspirin Other     VOMITING    Famotidine Other     VOMITING    Insulin Aspart U-100 Other     VOMITING    Insulin Detemir Other    Insulin Glargine Nausea Only and Other     VOMITING    Metformin Other    Penicillins Hives and Angioedema    Pioglitazone Nausea Only and Other    Sodium Citrate-Citric Acid Other     VOMITING    Adhesive Tape-Silicones Rash    Chlorhexidine Rash    Povidone-Iodine Rash     TOPICAL APPLICATION       Historical Diabetes Pharmacotherapy:  - Lantus (reaction)  - Levemir (reaction)  - Basaglar denied by insurance  - Trulicity (feet/legs to swell/constipation)  - Victoza  - Rybelsus  - Metformin (intolerance)  - Jardiance     SECONDARY PREVENTION  - Statin? No   - ACE-I/ARB? No  - Aspirin? No    Pertinent PMH Review:  - PMH of Pancreatitis: No  - PMH of Retinopathy:  No  - PMH of Urinary Tract Infections: Yes  - PMH of Yeast Infections: Yes  - PMH of MTC: No    Lab Review  Lab Results   Component Value Date    BILITOT 1.1 12/31/2024    CALCIUM 9.0 12/31/2024    CO2 29 12/31/2024     12/31/2024    CREATININE 0.61 12/31/2024    GLUCOSE 116 (H) 12/31/2024    ALKPHOS 117 (H) 12/31/2024    K 4.1 12/31/2024    PROT 6.5 12/31/2024     12/31/2024    AST 25 12/31/2024    ALT 33 12/31/2024    BUN 20 12/31/2024    ANIONGAP 12 12/31/2024    ALBUMIN 4.2 12/31/2024    GFRF >90 04/26/2023     Lab Results   Component Value Date    TRIG 89 10/02/2024    CHOL 219 (H) 10/02/2024    HDL 48.9 10/02/2024     Lab Results   Component Value Date    HGBA1C 8.6 (H) 12/31/2024    HGBA1C 12.9 (H) 10/02/2024    HGBA1C 8.2 (A) 04/26/2023     The 10-year ASCVD risk score (Niurka MARQUEZ, et al., 2019) is: 2%    Values used to calculate the score:      Age: 49 years      Sex: Female      Is Non- : No      Diabetic: Yes      Tobacco smoker: No      Systolic Blood Pressure: 101 mmHg      Is BP treated: No      HDL Cholesterol: 48.9 mg/dL      Total Cholesterol: 219 mg/dL    Drug Interactions:  None    Affordability/Accessibility:  None with current regimen    Preferred Pharmacy:  [facility]    Assessment/Plan   Problem List Items Addressed This Visit       Uncontrolled type 2 diabetes mellitus with hyperglycemia    Relevant Medications    Toujeo SoloStar U-300 Insulin 300 unit/mL (1.5 mL) injection    insulin lispro (HumaLOG KwikPen Insulin) 100 unit/mL injection    Other Relevant Orders    Referral to Clinical Pharmacy       ASSESSMENT:  Patients diabetes is uncontrolled with most recent A1c of 8.6%.     Blood sugars still remain the same after insulin dose increases, possibly a little worse. Still in 200s all day long. She states that her diet is still not the best, had donuts this week. Also forgot to take her PM insulins 3 x this week. We discussed importance of  monitoring diet and being adherent with medications. Discussed if blood sugar doesn't start getting under control, we start to increase risk of complications. She voices understanding. Today we will increase Toujeo to 38 units AM and 25 units PM and Humalog 12 units with breakfast and 20 units with lunch and dinner. Will follow up in 1 week.     PLAN:  INCREASE Toujeo to 38 units in the morning and 25 units at bedtime  INCREASE Humalog to 12 units breakfast and 20 units with lunch and dinner  Follow up with clinical pharmacist: 2/14/25 @ 9:20   Follow up with PCP: 7/24/25    Thank you,  Carolynn Porter, PharmD  Clinical Pharmacy Specialist  [phone]  [email or web address]     Continue all meds under the continuation of care with the referring provider and clinical pharmacy team.

## 2025-02-12 NOTE — PROGRESS NOTES
Pharmacist Clinic: Diabetes Management  Anjali Chamorro is a 50 y.o. female was referred to Clinical Pharmacy Team for diabetes management.   Referring Provider: Leann Tidwell DO  - Last visit with referring provider: 1/7/25    Subjective     HPI    Current Diabetes Pharmacotherapy:    - Humalog 12 units with breakfast, 20 units with lunch and dinner  - Toujeo Solostar U300 38 units in the morning and 25 units at bedtime      Social History:  Current diet:   - Never eats breakfast  - L: breakfast at lunch   - D: chili   - Tries not to buy sweets  - Diet soda    Current exercise:  - None    Eye exam for this year?: yes - 2024  Foot exam for this year?: no    Current monitoring regimen:   Patient is using: continuous glucose monitor  Type of CGM: Gt 3    Reported blood sugars:   See APG Report Below  - had donuts this week    Any episodes of hypoglycemia? No    Adverse Effects: None              Objective     There were no vitals taken for this visit.    Allergies   Allergen Reactions    Prednisone Swelling    Aspirin Other     VOMITING    Famotidine Other     VOMITING    Insulin Aspart U-100 Other     VOMITING    Insulin Detemir Other    Insulin Glargine Nausea Only and Other     VOMITING    Metformin Other    Penicillins Hives and Angioedema    Pioglitazone Nausea Only and Other    Sodium Citrate-Citric Acid Other     VOMITING    Adhesive Tape-Silicones Rash    Chlorhexidine Rash    Povidone-Iodine Rash     TOPICAL APPLICATION       Historical Diabetes Pharmacotherapy:  - Lantus (reaction)  - Levemir (reaction)  - Basaglar denied by insurance  - Trulicity (feet/legs to swell/constipation)  - Victoza  - Rybelsus  - Metformin (intolerance)  - Jardiance     SECONDARY PREVENTION  - Statin? No   - ACE-I/ARB? No  - Aspirin? No    Pertinent PMH Review:  - PMH of Pancreatitis: No  - PMH of Retinopathy: No  - PMH of Urinary Tract Infections: Yes  - PMH of Yeast Infections: Yes  - PMH of MTC: No    Lab Review  Lab  Results   Component Value Date    BILITOT 1.1 12/31/2024    CALCIUM 9.0 12/31/2024    CO2 29 12/31/2024     12/31/2024    CREATININE 0.61 12/31/2024    GLUCOSE 116 (H) 12/31/2024    ALKPHOS 117 (H) 12/31/2024    K 4.1 12/31/2024    PROT 6.5 12/31/2024     12/31/2024    AST 25 12/31/2024    ALT 33 12/31/2024    BUN 20 12/31/2024    ANIONGAP 12 12/31/2024    ALBUMIN 4.2 12/31/2024    GFRF >90 04/26/2023     Lab Results   Component Value Date    TRIG 89 10/02/2024    CHOL 219 (H) 10/02/2024    HDL 48.9 10/02/2024     Lab Results   Component Value Date    HGBA1C 8.6 (H) 12/31/2024    HGBA1C 12.9 (H) 10/02/2024    HGBA1C 8.2 (A) 04/26/2023     The 10-year ASCVD risk score (Niurka MARQUEZ, et al., 2019) is: 2.1%    Values used to calculate the score:      Age: 50 years      Sex: Female      Is Non- : No      Diabetic: Yes      Tobacco smoker: No      Systolic Blood Pressure: 101 mmHg      Is BP treated: No      HDL Cholesterol: 48.9 mg/dL      Total Cholesterol: 219 mg/dL    Drug Interactions:  None    Affordability/Accessibility:  None with current regimen    Preferred Pharmacy:  [facility]    Assessment/Plan   Problem List Items Addressed This Visit       Uncontrolled type 2 diabetes mellitus with hyperglycemia    Relevant Orders    Referral to Clinical Pharmacy       ASSESSMENT:  Patients diabetes is uncontrolled with most recent A1c of 8.6%.     Blood sugars have not changed with insulin increase last week. After looking at previous reports, it seemed like her blood sugar was better when she was just taking Toujeo at night and not splitting it up. Therefore we will stop Toujeo twice daily and only have her do it at nighttime at 35 units. Will also increase her mealtime insulin by 5 units. We will follow up closely Monday to see how this helps.     PLAN:  INCREASE Humalog to 17 units with breakfast and 25 units with lunch and dinner  CHANGE Toujeo to 35 units at HS  Follow up with  clinical pharmacist: 2/17/25 @ 9   Follow up with PCP: 7/24/25    Thank you,  Carolynn Porter, PharmD  Clinical Pharmacy Specialist  [phone]  [email or web address]     Continue all meds under the continuation of care with the referring provider and clinical pharmacy team.

## 2025-02-14 ENCOUNTER — APPOINTMENT (OUTPATIENT)
Dept: PHARMACY | Facility: HOSPITAL | Age: 50
End: 2025-02-14
Payer: COMMERCIAL

## 2025-02-14 DIAGNOSIS — E11.65 UNCONTROLLED TYPE 2 DIABETES MELLITUS WITH HYPERGLYCEMIA: ICD-10-CM

## 2025-02-17 ENCOUNTER — APPOINTMENT (OUTPATIENT)
Dept: PHARMACY | Facility: HOSPITAL | Age: 50
End: 2025-02-17
Payer: COMMERCIAL

## 2025-02-17 DIAGNOSIS — E11.65 UNCONTROLLED TYPE 2 DIABETES MELLITUS WITH HYPERGLYCEMIA: ICD-10-CM

## 2025-02-17 RX ORDER — INSULIN GLARGINE 300 U/ML
35 INJECTION, SOLUTION SUBCUTANEOUS NIGHTLY
Qty: 15 ML | Refills: 5 | Status: SHIPPED | OUTPATIENT
Start: 2025-02-17

## 2025-02-17 NOTE — PROGRESS NOTES
Pharmacist Clinic: Diabetes Management  Anjali Chamorro is a 50 y.o. female was referred to Clinical Pharmacy Team for diabetes management.   Referring Provider: Leann Tidwell, DO  - Last visit with referring provider: 1/7/25    Subjective     HPI    Current Diabetes Pharmacotherapy:    - Humalog 17 units with breakfast and 25 units with lunch and dinner  - Toujeo 35 units at bedtime    Social History:  Current diet:   - Never eats breakfast  - L: breakfast at lunch   - D: chili   - Tries not to buy sweets  - Diet soda    Current exercise:  - None    Eye exam for this year?: yes - 2024  Foot exam for this year?: no    Current monitoring regimen:   Patient is using: continuous glucose monitor  Type of CGM: Gt 3    Reported blood sugars:   See APG Report Below  - had donuts this week    Any episodes of hypoglycemia? No    Adverse Effects: None                Objective     There were no vitals taken for this visit.    Allergies   Allergen Reactions    Prednisone Swelling    Aspirin Other     VOMITING    Famotidine Other     VOMITING    Insulin Aspart U-100 Other     VOMITING    Insulin Detemir Other    Insulin Glargine Nausea Only and Other     VOMITING    Metformin Other    Penicillins Hives and Angioedema    Pioglitazone Nausea Only and Other    Sodium Citrate-Citric Acid Other     VOMITING    Adhesive Tape-Silicones Rash    Chlorhexidine Rash    Povidone-Iodine Rash     TOPICAL APPLICATION       Historical Diabetes Pharmacotherapy:  - Lantus (reaction)  - Levemir (reaction)  - Basaglar denied by insurance  - Trulicity (feet/legs to swell/constipation)  - Victoza  - Rybelsus  - Metformin (intolerance)  - Jardiance     SECONDARY PREVENTION  - Statin? No   - ACE-I/ARB? No  - Aspirin? No    Pertinent PMH Review:  - PMH of Pancreatitis: No  - PMH of Retinopathy: No  - PMH of Urinary Tract Infections: Yes  - PMH of Yeast Infections: Yes  - PMH of MTC: No    Lab Review  Lab Results   Component Value Date    BILITOT  1.1 12/31/2024    CALCIUM 9.0 12/31/2024    CO2 29 12/31/2024     12/31/2024    CREATININE 0.61 12/31/2024    GLUCOSE 116 (H) 12/31/2024    ALKPHOS 117 (H) 12/31/2024    K 4.1 12/31/2024    PROT 6.5 12/31/2024     12/31/2024    AST 25 12/31/2024    ALT 33 12/31/2024    BUN 20 12/31/2024    ANIONGAP 12 12/31/2024    ALBUMIN 4.2 12/31/2024    GFRF >90 04/26/2023     Lab Results   Component Value Date    TRIG 89 10/02/2024    CHOL 219 (H) 10/02/2024    HDL 48.9 10/02/2024     Lab Results   Component Value Date    HGBA1C 8.6 (H) 12/31/2024    HGBA1C 12.9 (H) 10/02/2024    HGBA1C 8.2 (A) 04/26/2023     The 10-year ASCVD risk score (Niurka MARQUEZ, et al., 2019) is: 2.1%    Values used to calculate the score:      Age: 50 years      Sex: Female      Is Non- : No      Diabetic: Yes      Tobacco smoker: No      Systolic Blood Pressure: 101 mmHg      Is BP treated: No      HDL Cholesterol: 48.9 mg/dL      Total Cholesterol: 219 mg/dL    Drug Interactions:  None    Affordability/Accessibility:  None with current regimen    Preferred Pharmacy:  [facility]    Assessment/Plan   Problem List Items Addressed This Visit       Uncontrolled type 2 diabetes mellitus with hyperglycemia    Relevant Medications    Toujeo SoloStar U-300 Insulin 300 unit/mL (1.5 mL) injection    Other Relevant Orders    Referral to Clinical Pharmacy       ASSESSMENT:  Patients diabetes is uncontrolled with most recent A1c of 8.6%.     Blood sugars are showing slight improvement. She is experiencing [place] phenomenon where blood sugars are good overnight but in the morning, blood sugars are going high even prior to eating. Unable to add on another medication for this due to her intolerance to other medications. At this time I will increase Humalog to 20 units with breakfast and 27 units with lunch and dinner. Will keep Toujeo at current dose as I don't want to risk any low sugars over night.    PLAN:  INCREASE Humalog to  20 units with breakfast, 27 units with lunch and dinner  CONTINUE Toujeo at 35 units HS  Follow up with clinical pharmacist: 2/24/25 @ 9:40   Follow up with PCP: 7/24/25    Thank you,  Carolynn Porter, PharmD  Clinical Pharmacy Specialist  [phone]  [email or web address]     Continue all meds under the continuation of care with the referring provider and clinical pharmacy team.

## 2025-02-19 NOTE — PROGRESS NOTES
Pharmacist Clinic: Diabetes Management  Anjali Chamorro is a 50 y.o. female was referred to Clinical Pharmacy Team for diabetes management.   Referring Provider: Leann Tidwell, DO  - Last visit with referring provider: 1/7/25    Subjective     HPI    Current Diabetes Pharmacotherapy:    - Humalog 20 units with breakfast and 27 units with lunch and dinner  - Toujeo 35 units at bedtime    Social History:  Current diet:   - Never eats breakfast  - L: breakfast at lunch   - D: chili   - Tries not to buy sweets  - Diet soda    Current exercise:  - None    Eye exam for this year?: yes - 2024  Foot exam for this year?: no    Current monitoring regimen:   Patient is using: continuous glucose monitor  Type of CGM: Gt 3    Reported blood sugars:   See APG Report Below  - had donuts this week    Any episodes of hypoglycemia? No    Adverse Effects: None                Objective     There were no vitals taken for this visit.    Allergies   Allergen Reactions    Prednisone Swelling    Aspirin Other     VOMITING    Famotidine Other     VOMITING    Insulin Aspart U-100 Other     VOMITING    Insulin Detemir Other    Insulin Glargine Nausea Only and Other     VOMITING    Metformin Other    Penicillins Hives and Angioedema    Pioglitazone Nausea Only and Other    Sodium Citrate-Citric Acid Other     VOMITING    Adhesive Tape-Silicones Rash    Chlorhexidine Rash    Povidone-Iodine Rash     TOPICAL APPLICATION       Historical Diabetes Pharmacotherapy:  - Lantus (reaction)  - Levemir (reaction)  - Basaglar denied by insurance  - Trulicity (feet/legs to swell/constipation)  - Victoza  - Rybelsus  - Metformin (intolerance)  - Jardiance     SECONDARY PREVENTION  - Statin? No   - ACE-I/ARB? No  - Aspirin? No    Pertinent PMH Review:  - PMH of Pancreatitis: No  - PMH of Retinopathy: No  - PMH of Urinary Tract Infections: Yes  - PMH of Yeast Infections: Yes  - PMH of MTC: No    Lab Review  Lab Results   Component Value Date    BILITOT  1.1 12/31/2024    CALCIUM 9.0 12/31/2024    CO2 29 12/31/2024     12/31/2024    CREATININE 0.61 12/31/2024    GLUCOSE 116 (H) 12/31/2024    ALKPHOS 117 (H) 12/31/2024    K 4.1 12/31/2024    PROT 6.5 12/31/2024     12/31/2024    AST 25 12/31/2024    ALT 33 12/31/2024    BUN 20 12/31/2024    ANIONGAP 12 12/31/2024    ALBUMIN 4.2 12/31/2024    GFRF >90 04/26/2023     Lab Results   Component Value Date    TRIG 89 10/02/2024    CHOL 219 (H) 10/02/2024    HDL 48.9 10/02/2024     Lab Results   Component Value Date    HGBA1C 8.6 (H) 12/31/2024    HGBA1C 12.9 (H) 10/02/2024    HGBA1C 8.2 (A) 04/26/2023     The 10-year ASCVD risk score (Niurka MARQUEZ, et al., 2019) is: 2.1%    Values used to calculate the score:      Age: 50 years      Sex: Female      Is Non- : No      Diabetic: Yes      Tobacco smoker: No      Systolic Blood Pressure: 101 mmHg      Is BP treated: No      HDL Cholesterol: 48.9 mg/dL      Total Cholesterol: 219 mg/dL    Drug Interactions:  None    Affordability/Accessibility:  None with current regimen    Preferred [facility]acy:  [facility]    Assessment/Plan   Problem List Items Addressed This Visit    None        ASSESSMENT:  Patients diabetes is uncontrolled with most recent A1c of 8.6%.         PLAN:    Follow up with clinical pharmacist:   Follow up with PCP: 7/24/25    Thank you,  Carolynn Porter, PharmD  Clinical Pharmacy Specialist  [phone]  [email or web address]     Continue all meds under the continuation of care with the referring provider and clinical pharmacy team.     dinner dose or Toujeo by 2 units to see if it helps. We will follow up in 2 weeks.     PLAN:  INCREASE Humalog to 32 units with meals   CONTINUE Toujeo 35 units HS   Follow up with clinical pharmacist: 3/10/25 @ 9:40   Follow up with PCP: 7/24/25    Thank you,  Carolynn Porter, PharmD  Clinical Pharmacy Specialist  [phone]  [email or web address]     Continue all meds under the continuation of care with the referring provider and clinical pharmacy team.

## 2025-02-24 ENCOUNTER — APPOINTMENT (OUTPATIENT)
Dept: PHARMACY | Facility: HOSPITAL | Age: 50
End: 2025-02-24
Payer: COMMERCIAL

## 2025-02-24 ENCOUNTER — TELEPHONE (OUTPATIENT)
Dept: PRIMARY CARE | Facility: CLINIC | Age: 50
End: 2025-02-24

## 2025-02-24 DIAGNOSIS — E11.65 UNCONTROLLED TYPE 2 DIABETES MELLITUS WITH HYPERGLYCEMIA: ICD-10-CM

## 2025-02-24 RX ORDER — INSULIN GLARGINE 300 U/ML
35 INJECTION, SOLUTION SUBCUTANEOUS NIGHTLY
Qty: 13.5 ML | Refills: 5 | Status: SHIPPED | OUTPATIENT
Start: 2025-02-24

## 2025-02-24 RX ORDER — INSULIN LISPRO 100 [IU]/ML
32 INJECTION, SOLUTION INTRAVENOUS; SUBCUTANEOUS
Qty: 45 ML | Refills: 6 | Status: SHIPPED | OUTPATIENT
Start: 2025-02-24

## 2025-02-24 RX ORDER — BLOOD-GLUCOSE SENSOR
EACH MISCELLANEOUS
Qty: 6 EACH | Refills: 11 | Status: SHIPPED | OUTPATIENT
Start: 2025-02-24

## 2025-02-24 NOTE — TELEPHONE ENCOUNTER
Patient having shoulder surgery in March and surgeon office told her to contact us about which meds to stop before surgery. She should skip all morning meds. She can take her Lantus at bedtime like usual.  Thanks,  Leann Tidwell, DO

## 2025-03-07 ENCOUNTER — TELEPHONE (OUTPATIENT)
Dept: PHARMACY | Facility: HOSPITAL | Age: 50
End: 2025-03-07
Payer: COMMERCIAL

## 2025-03-07 NOTE — TELEPHONE ENCOUNTER
Patient called stating that pharmacy did not give 3 month supply of medications from last visit. States she only got 2 boxes of polly sensors. I called pharmacy and they said due to insurance, they will only dispense that much.     Carolynn Porter, PharmD  Clinical Pharmacy Specialist - Primary Care  [phone]  [email or web address]

## 2025-03-10 ENCOUNTER — APPOINTMENT (OUTPATIENT)
Dept: PHARMACY | Facility: HOSPITAL | Age: 50
End: 2025-03-10
Payer: COMMERCIAL

## 2025-03-10 DIAGNOSIS — E11.65 UNCONTROLLED TYPE 2 DIABETES MELLITUS WITH HYPERGLYCEMIA: ICD-10-CM

## 2025-03-10 NOTE — PROGRESS NOTES
Pharmacist Clinic: Diabetes Management  Anjali Chamorro is a 50 y.o. female was referred to Clinical Pharmacy Team for diabetes management.   Referring Provider: Leann Tidwell, DO  - Last visit with referring provider: 1/7/25    Subjective     HPI    Current Diabetes Pharmacotherapy:    - Humalog 32 units TID with meals   - Toujeo 35 units at bedtime    Social History:  Current diet:   - Never eats breakfast  - L: breakfast at lunch   - D: chili   - Tries not to buy sweets  - Diet soda    Current exercise:  - None    Eye exam for this year?: yes - 2024  Foot exam for this year?: no    Current monitoring regimen:   Patient is using: continuous glucose monitor  Type of CGM: Gt 3    Reported blood sugars:   See APG Report Below    Any episodes of hypoglycemia? No    Adverse Effects: None          Objective     There were no vitals taken for this visit.    Allergies   Allergen Reactions    Prednisone Swelling    Aspirin Other     VOMITING    Famotidine Other     VOMITING    Insulin Aspart U-100 Other     VOMITING    Insulin Detemir Other    Insulin Glargine Nausea Only and Other     VOMITING    Metformin Other    Penicillins Hives and Angioedema    Pioglitazone Nausea Only and Other    Sodium Citrate-Citric Acid Other     VOMITING    Adhesive Tape-Silicones Rash    Chlorhexidine Rash    Povidone-Iodine Rash     TOPICAL APPLICATION       Historical Diabetes Pharmacotherapy:  - Lantus (reaction)  - Levemir (reaction)  - Basaglar denied by insurance  - Trulicity (feet/legs to swell/constipation)  - Victoza  - Rybelsus  - Metformin (intolerance)  - Jardiance     SECONDARY PREVENTION  - Statin? No   - ACE-I/ARB? No  - Aspirin? No    Pertinent PMH Review:  - PMH of Pancreatitis: No  - PMH of Retinopathy: No  - PMH of Urinary Tract Infections: Yes  - PMH of Yeast Infections: Yes  - PMH of MTC: No    Lab Review  Lab Results   Component Value Date    BILITOT 1.1 12/31/2024    CALCIUM 9.0 12/31/2024    CO2 29 12/31/2024      12/31/2024    CREATININE 0.61 12/31/2024    GLUCOSE 116 (H) 12/31/2024    ALKPHOS 117 (H) 12/31/2024    K 4.1 12/31/2024    PROT 6.5 12/31/2024     12/31/2024    AST 25 12/31/2024    ALT 33 12/31/2024    BUN 20 12/31/2024    ANIONGAP 12 12/31/2024    ALBUMIN 4.2 12/31/2024    GFRF >90 04/26/2023     Lab Results   Component Value Date    TRIG 89 10/02/2024    CHOL 219 (H) 10/02/2024    HDL 48.9 10/02/2024     Lab Results   Component Value Date    HGBA1C 8.6 (H) 12/31/2024    HGBA1C 12.9 (H) 10/02/2024    HGBA1C 8.2 (A) 04/26/2023     The 10-year ASCVD risk score (Niurka MARQUEZ, et al., 2019) is: 2.1%    Values used to calculate the score:      Age: 50 years      Sex: Female      Is Non- : No      Diabetic: Yes      Tobacco smoker: No      Systolic Blood Pressure: 101 mmHg      Is BP treated: No      HDL Cholesterol: 48.9 mg/dL      Total Cholesterol: 219 mg/dL    Drug Interactions:  None    Affordability/Accessibility:  None with current regimen    Preferred Pharmacy:  [facility]    Assessment/Plan   Problem List Items Addressed This Visit       Uncontrolled type 2 diabetes mellitus with hyperglycemia    Relevant Orders    Referral to Clinical Pharmacy       ASSESSMENT:  Patients diabetes is uncontrolled with most recent A1c of 8.6%.     Might be experiencing over basalization where high basal insulin doses can lead to day time highs despite achieving fasting blood glucose levels. Will decrease Toujeo to 32 units and increase Humalog to 35 units with meals. Recommended endocrinologist but patient would not like to see one at this time. Will make these adjustments and follow up in 2 weeks.    PLAN:  DECREASE Toujeo to 32 units   INCREASE Humalog to 35 units with meals  Follow up with clinical pharmacist: 3/24/25 @ 9:40   Follow up with PCP: 7/24/25    Thank you,  Carolynn Porter, PharmD  Clinical Pharmacy Specialist  [phone]  [email or web address]     Continue all  meds under the continuation of care with the referring provider and clinical pharmacy team.

## 2025-03-12 ENCOUNTER — TELEPHONE (OUTPATIENT)
Dept: PHARMACY | Facility: HOSPITAL | Age: 50
End: 2025-03-12
Payer: COMMERCIAL

## 2025-03-12 NOTE — TELEPHONE ENCOUNTER
Patient called and left a voicemail. Returned call today and patient states blood sugars were running high after shoulder stimulation on Monday. Sugars seemed to have finally decreased. Told her to keep eye on it and if they increase back up into 300-400's again to increase Humalog from 35 units to 37 units. May even consider a sliding scale if needed in the future.         Carolynn Porter, PharmD  Clinical Pharmacy Specialist - Primary Care  [phone]  [email or web address]

## 2025-03-24 ENCOUNTER — APPOINTMENT (OUTPATIENT)
Dept: PHARMACY | Facility: HOSPITAL | Age: 50
End: 2025-03-24
Payer: COMMERCIAL

## 2025-03-24 DIAGNOSIS — E11.65 UNCONTROLLED TYPE 2 DIABETES MELLITUS WITH HYPERGLYCEMIA: ICD-10-CM

## 2025-03-24 NOTE — PROGRESS NOTES
Pharmacist Clinic: Diabetes Management  Anjali Chamorro is a 50 y.o. female was referred to Clinical Pharmacy Team for diabetes management.   Referring Provider: Leann Tidwell, DO  - Last visit with referring provider: 1/7/25    Subjective     HPI    Current Diabetes Pharmacotherapy:    - Humalog 35 units TID with meals   - Toujeo 30 units at bedtime    Social History:  Current diet:   - Never eats breakfast  - L: breakfast at lunch   - D: chili   - Tries not to buy sweets  - Diet soda    Current exercise:  - None    Eye exam for this year?: yes - 2024  Foot exam for this year?: no    Current monitoring regimen:   Patient is using: continuous glucose monitor  Type of CGM: Gt 3    Reported blood sugars:   See APG Report Below    Any episodes of hypoglycemia? No    Adverse Effects: None          Objective     There were no vitals taken for this visit.    Allergies   Allergen Reactions    Prednisone Swelling    Aspirin Other     VOMITING    Famotidine Other     VOMITING    Insulin Aspart U-100 Other     VOMITING    Insulin Detemir Other    Insulin Glargine Nausea Only and Other     VOMITING    Metformin Other    Penicillins Hives and Angioedema    Pioglitazone Nausea Only and Other    Sodium Citrate-Citric Acid Other     VOMITING    Adhesive Tape-Silicones Rash    Chlorhexidine Rash    Povidone-Iodine Rash     TOPICAL APPLICATION       Historical Diabetes Pharmacotherapy:  - Lantus (reaction)  - Levemir (reaction)  - Basaglar denied by insurance  - Trulicity (feet/legs to swell/constipation)  - Victoza  - Rybelsus  - Metformin (intolerance)  - Jardiance     SECONDARY PREVENTION  - Statin? No   - ACE-I/ARB? No  - Aspirin? No    Pertinent PMH Review:  - PMH of Pancreatitis: No  - PMH of Retinopathy: No  - PMH of Urinary Tract Infections: Yes  - PMH of Yeast Infections: Yes  - PMH of MTC: No    Lab Review  Lab Results   Component Value Date    BILITOT 1.1 12/31/2024    CALCIUM 9.0 12/31/2024    CO2 29 12/31/2024      12/31/2024    CREATININE 0.61 12/31/2024    GLUCOSE 116 (H) 12/31/2024    ALKPHOS 117 (H) 12/31/2024    K 4.1 12/31/2024    PROT 6.5 12/31/2024     12/31/2024    AST 25 12/31/2024    ALT 33 12/31/2024    BUN 20 12/31/2024    ANIONGAP 12 12/31/2024    ALBUMIN 4.2 12/31/2024    GFRF >90 04/26/2023     Lab Results   Component Value Date    TRIG 89 10/02/2024    CHOL 219 (H) 10/02/2024    HDL 48.9 10/02/2024     Lab Results   Component Value Date    HGBA1C 8.6 (H) 12/31/2024    HGBA1C 12.9 (H) 10/02/2024    HGBA1C 8.2 (A) 04/26/2023     The 10-year ASCVD risk score (Niurka MARQUEZ, et al., 2019) is: 2.1%    Values used to calculate the score:      Age: 50 years      Sex: Female      Is Non- : No      Diabetic: Yes      Tobacco smoker: No      Systolic Blood Pressure: 101 mmHg      Is BP treated: No      HDL Cholesterol: 48.9 mg/dL      Total Cholesterol: 219 mg/dL    Drug Interactions:  None    Affordability/Accessibility:  None with current regimen    Preferred Pharmacy:  [facility]    Assessment/Plan   Problem List Items Addressed This Visit       Uncontrolled type 2 diabetes mellitus with hyperglycemia    Relevant Orders    Hemoglobin A1c    Referral to Clinical Pharmacy       ASSESSMENT:  Patients diabetes is uncontrolled with most recent A1c of 8.6%.     Patient's time in range is improving. Seems to still be spiking after meals and going low around 11p-1am. Will decrease Toujeo down further to 27 units and increase Humalog to 38 units. A1c is also due as well.    PLAN:  DECREASE Toujeo to 27 units HS  INCREASE Humalog to 38 units   Follow up with clinical pharmacist: 4/7/25 @ 10:20   Follow up with PCP: 7/24/25    Thank you,  Carolynn Porter, PharmD  Clinical Pharmacy Specialist  [phone]  [email or web address]     Continue all meds under the continuation of care with the referring provider and clinical pharmacy team.

## 2025-04-04 NOTE — PROGRESS NOTES
Pharmacist Clinic: Diabetes Management  Anjali Chamorro is a 50 y.o. female was referred to Clinical Pharmacy Team for diabetes management.   Referring Provider: Leann Tidwell, DO  - Last visit with referring provider: 1/7/25    Subjective     HPI    Current Diabetes Pharmacotherapy:    - Humalog 35 units TID with meals   - Toujeo 28 units at bedtime    Social History:  Current diet:   - Never eats breakfast  - L: breakfast at lunch   - D: chili   - Tries not to buy sweets  - Diet soda    Current exercise:  - None    Eye exam for this year?: yes - 2024  Foot exam for this year?: no    Current monitoring regimen:   Patient is using: continuous glucose monitor  Type of CGM: Gt 3    Reported blood sugars:   See APG Report Below    Any episodes of hypoglycemia? Some    Adverse Effects: None          Objective     There were no vitals taken for this visit.    Allergies   Allergen Reactions    Prednisone Swelling    Aspirin Other     VOMITING    Famotidine Other     VOMITING    Insulin Aspart U-100 Other     VOMITING    Insulin Detemir Other    Insulin Glargine Nausea Only and Other     VOMITING    Metformin Other    Penicillins Hives and Angioedema    Pioglitazone Nausea Only and Other    Sodium Citrate-Citric Acid Other     VOMITING    Adhesive Tape-Silicones Rash    Chlorhexidine Rash    Povidone-Iodine Rash     TOPICAL APPLICATION       Historical Diabetes Pharmacotherapy:  - Lantus (reaction)  - Levemir (reaction)  - Basaglar denied by insurance  - Trulicity (feet/legs to swell/constipation)  - Victoza  - Rybelsus  - Metformin (intolerance)  - Jardiance     SECONDARY PREVENTION  - Statin? No   - ACE-I/ARB? No  - Aspirin? No    Pertinent PMH Review:  - PMH of Pancreatitis: No  - PMH of Retinopathy: No  - PMH of Urinary Tract Infections: Yes  - PMH of Yeast Infections: Yes  - PMH of MTC: No    Lab Review  Lab Results   Component Value Date    BILITOT 1.1 12/31/2024    CALCIUM 9.0 12/31/2024    CO2 29 12/31/2024  "    12/31/2024    CREATININE 0.61 12/31/2024    GLUCOSE 116 (H) 12/31/2024    ALKPHOS 117 (H) 12/31/2024    K 4.1 12/31/2024    PROT 6.5 12/31/2024     12/31/2024    AST 25 12/31/2024    ALT 33 12/31/2024    BUN 20 12/31/2024    ANIONGAP 12 12/31/2024    ALBUMIN 4.2 12/31/2024    GFRF >90 04/26/2023     Lab Results   Component Value Date    TRIG 89 10/02/2024    CHOL 219 (H) 10/02/2024    HDL 48.9 10/02/2024     Lab Results   Component Value Date    HGBA1C 8.6 (H) 12/31/2024    HGBA1C 12.9 (H) 10/02/2024    HGBA1C 8.2 (A) 04/26/2023     The 10-year ASCVD risk score (Niurka MARQUEZ, et al., 2019) is: 2.1%    Values used to calculate the score:      Age: 50 years      Sex: Female      Is Non- : No      Diabetic: Yes      Tobacco smoker: No      Systolic Blood Pressure: 101 mmHg      Is BP treated: No      HDL Cholesterol: 48.9 mg/dL      Total Cholesterol: 219 mg/dL    Drug Interactions:  None    Affordability/Accessibility:  None with current regimen    Preferred Pharmacy:  [facility]    Assessment/Plan   Problem List Items Addressed This Visit       Uncontrolled type 2 diabetes mellitus with hyperglycemia    Relevant Orders    Referral to Clinical Pharmacy       ASSESSMENT:  Patients diabetes is uncontrolled with most recent A1c of 8.6%.     Patient's blood sugars still stuck in the 30-40% time in target range even with insulin adjustments. She still refuses to try Trulicity. Also does not want to see an endocrinologist, states \"I do not trust them.\" She did not increase Humalog as told last time so will have her increase Humalog to 38 units with lunch and dinner.     PLAN:  INCREASE Humalog to 35 units with breakfast and 38 units with lunch and dinner  CONTINUE all other DM medications  Follow up with clinical pharmacist: 4/28/25 @ 10:20  Follow up with PCP: 7/24/25    Thank you,  Carolynn Porter, PharmD  Clinical Pharmacy " Specialist  [phone]  [email or web address]     Continue all meds under the continuation of care with the referring provider and clinical pharmacy team.

## 2025-04-07 ENCOUNTER — APPOINTMENT (OUTPATIENT)
Dept: PHARMACY | Facility: HOSPITAL | Age: 50
End: 2025-04-07
Payer: COMMERCIAL

## 2025-04-07 DIAGNOSIS — E11.65 UNCONTROLLED TYPE 2 DIABETES MELLITUS WITH HYPERGLYCEMIA: ICD-10-CM

## 2025-04-25 RX ORDER — TIRZEPATIDE 2.5 MG/.5ML
2.5 INJECTION, SOLUTION SUBCUTANEOUS
Qty: 2 ML | Refills: 1 | Status: SHIPPED | OUTPATIENT
Start: 2025-04-25 | End: 2025-04-28 | Stop reason: SDUPTHER

## 2025-04-25 NOTE — PROGRESS NOTES
Pharmacist Clinic: Diabetes Management  Anjali Chamorro is a 50 y.o. female was referred to Clinical Pharmacy Team for diabetes management.   Referring Provider: Leann Tidwell DO  - Last visit with referring provider: 1/7/25    Subjective     HPI    Current Diabetes Pharmacotherapy:    - Humalog 35 units with breakfast and 38 units with lunch and dinner  - Toujeo 28 units at bedtime    Social History:  Current diet:   - Never eats breakfast  - L: breakfast at lunch   - D: chili   - Tries not to buy sweets  - Diet soda  - Does not eat veggies; green bean and corn sometimes    Current exercise:  - None    Eye exam for this year?: yes - 2024  Foot exam for this year?: no    Current monitoring regimen:   Patient is using: continuous glucose monitor  Type of CGM: Gt 3    Reported blood sugars:   See APG Report Below    Any episodes of hypoglycemia? Some    Adverse Effects: Headache            Objective     There were no vitals taken for this visit.    Allergies   Allergen Reactions    Prednisone Swelling    Aspirin Other     VOMITING    Famotidine Other     VOMITING    Insulin Aspart U-100 Other     VOMITING    Insulin Detemir Other    Insulin Glargine Nausea Only and Other     VOMITING    Metformin Other    Penicillins Hives and Angioedema    Pioglitazone Nausea Only and Other    Sodium Citrate-Citric Acid Other     VOMITING    Adhesive Tape-Silicones Rash    Chlorhexidine Rash    Povidone-Iodine Rash     TOPICAL APPLICATION       Historical Diabetes Pharmacotherapy:  - Lantus (vomiting)  - Levemir (vomiting)  - Trulicity (feet/legs to swell/constipation) 5/15/24-6/28/24  - Victoza (vomiting)   - Metformin (intolerance/vomiting)  - Pioglitazone (nausea)  - Jardiance (side effects) 5/15/24-6/28/24  - Glipizide ER 10 mg twice daily (did not bring down blood sugars) 5/15/24 - 11/15/24  - Glipzide IR 5 mg with breakfast and 10 mg with dinner (did not bring down blood sugars) 11/15/25 - 12/30/25    SECONDARY  PREVENTION  - Statin? No   - ACE-I/ARB? No  - Aspirin? No    Pertinent PMH Review:  - PMH of Pancreatitis: No  - PMH of Retinopathy: No  - PMH of Urinary Tract Infections: Yes  - PMH of Yeast Infections: Yes  - PMH of MTC: No    Lab Review  Lab Results   Component Value Date    BILITOT 1.1 12/31/2024    CALCIUM 9.0 12/31/2024    CO2 29 12/31/2024     12/31/2024    CREATININE 0.61 12/31/2024    GLUCOSE 116 (H) 12/31/2024    ALKPHOS 117 (H) 12/31/2024    K 4.1 12/31/2024    PROT 6.5 12/31/2024     12/31/2024    AST 25 12/31/2024    ALT 33 12/31/2024    BUN 20 12/31/2024    ANIONGAP 12 12/31/2024    ALBUMIN 4.2 12/31/2024    GFRF >90 04/26/2023     Lab Results   Component Value Date    TRIG 89 10/02/2024    CHOL 219 (H) 10/02/2024    HDL 48.9 10/02/2024     Lab Results   Component Value Date    HGBA1C 8.6 (H) 12/31/2024    HGBA1C 12.9 (H) 10/02/2024    HGBA1C 8.2 (A) 04/26/2023     The 10-year ASCVD risk score (Niurka MARQUEZ, et al., 2019) is: 2.1%    Values used to calculate the score:      Age: 50 years      Sex: Female      Is Non- : No      Diabetic: Yes      Tobacco smoker: No      Systolic Blood Pressure: 101 mmHg      Is BP treated: No      HDL Cholesterol: 48.9 mg/dL      Total Cholesterol: 219 mg/dL    Drug Interactions:  None    Affordability/Accessibility:  None with current regimen    Preferred Pharmacy:  [facility]    Assessment/Plan   Problem List Items Addressed This Visit       Uncontrolled type 2 diabetes mellitus with hyperglycemia - Primary    Relevant Medications    tirzepatide (Mounjaro) 2.5 mg/0.5 mL pen injector    Other Relevant Orders    Referral to Clinical Pharmacy    Comprehensive metabolic panel    Magnesium    POCT glycosylated hemoglobin (Hb A1C) manually resulted     ASSESSMENT:  Patients diabetes is uncontrolled with most recent A1c of 8.6%.     Blood sugars are bouncing around from being very high to going low. She is still stuck at the time in  range of 38%. Finally got Mounjaro PA approved. Will have her start 2.5 mg dose. Since she is going low throughout day sometimes, will pull back on Humalog a bit since starting Mounjaro. She also requests I send Mounjaro to [facility] as she does not want any of her meds at any  pharmacy. She also wishes to get A1c done in the clinic.    She is also complaining of headaches. I told her this is most likely due to fluctuations in blood sugars but she would like to have a CMP and magnesium checked. Will put it and results will be discussed with her by Dr. Tidwell.     ADDENDUM: POC A1c came to 8.0% which is an improvement.      PLAN:  DECREASE Humalog to 32 units with breakfast and 35 units with lunch and dinner  START Mounjaro 2.5 mg weekly  CONTINUE all other DM medications  Follow up with clinical pharmacist: 5/16/25 @ 10  Follow up with PCP: 7/24/25    Thank you,  Carolynn Porter, PharmD  Clinical Pharmacy Specialist  [phone]  [email or web address]     Continue all meds under the continuation of care with the referring provider and clinical pharmacy team.

## 2025-04-28 ENCOUNTER — APPOINTMENT (OUTPATIENT)
Dept: PHARMACY | Facility: HOSPITAL | Age: 50
End: 2025-04-28
Payer: COMMERCIAL

## 2025-04-28 ENCOUNTER — CLINICAL SUPPORT (OUTPATIENT)
Dept: PRIMARY CARE | Facility: CLINIC | Age: 50
End: 2025-04-28
Payer: COMMERCIAL

## 2025-04-28 DIAGNOSIS — E11.65 UNCONTROLLED TYPE 2 DIABETES MELLITUS WITH HYPERGLYCEMIA: Primary | ICD-10-CM

## 2025-04-28 LAB — POC FINGERSTICK BLOOD GLUCOSE: 8 MG/DL (ref 70–100)

## 2025-04-28 PROCEDURE — 82962 GLUCOSE BLOOD TEST: CPT | Performed by: FAMILY MEDICINE

## 2025-04-28 RX ORDER — TIRZEPATIDE 2.5 MG/.5ML
2.5 INJECTION, SOLUTION SUBCUTANEOUS
Qty: 2 ML | Refills: 1 | Status: SHIPPED | OUTPATIENT
Start: 2025-04-28

## 2025-05-12 ENCOUNTER — OFFICE VISIT (OUTPATIENT)
Dept: PRIMARY CARE | Facility: CLINIC | Age: 50
End: 2025-05-12
Payer: COMMERCIAL

## 2025-05-12 ENCOUNTER — TELEPHONE (OUTPATIENT)
Dept: PRIMARY CARE | Facility: CLINIC | Age: 50
End: 2025-05-12

## 2025-05-12 VITALS
TEMPERATURE: 96.7 F | HEIGHT: 69 IN | SYSTOLIC BLOOD PRESSURE: 108 MMHG | DIASTOLIC BLOOD PRESSURE: 60 MMHG | OXYGEN SATURATION: 96 % | BODY MASS INDEX: 30.21 KG/M2 | RESPIRATION RATE: 20 BRPM | WEIGHT: 204 LBS | HEART RATE: 94 BPM

## 2025-05-12 DIAGNOSIS — G43.009 MIGRAINE WITHOUT AURA AND WITHOUT STATUS MIGRAINOSUS, NOT INTRACTABLE: ICD-10-CM

## 2025-05-12 DIAGNOSIS — E11.65 UNCONTROLLED TYPE 2 DIABETES MELLITUS WITH HYPERGLYCEMIA: Primary | Chronic | ICD-10-CM

## 2025-05-12 PROCEDURE — 3008F BODY MASS INDEX DOCD: CPT | Performed by: FAMILY MEDICINE

## 2025-05-12 PROCEDURE — 3074F SYST BP LT 130 MM HG: CPT | Performed by: FAMILY MEDICINE

## 2025-05-12 PROCEDURE — 1036F TOBACCO NON-USER: CPT | Performed by: FAMILY MEDICINE

## 2025-05-12 PROCEDURE — 3078F DIAST BP <80 MM HG: CPT | Performed by: FAMILY MEDICINE

## 2025-05-12 PROCEDURE — 99214 OFFICE O/P EST MOD 30 MIN: CPT | Performed by: FAMILY MEDICINE

## 2025-05-12 RX ORDER — ERENUMAB-AOOE 70 MG/ML
70 INJECTION SUBCUTANEOUS
Qty: 1 ML | Refills: 11 | Status: SHIPPED | OUTPATIENT
Start: 2025-05-12

## 2025-05-12 SDOH — ECONOMIC STABILITY: FOOD INSECURITY: WITHIN THE PAST 12 MONTHS, THE FOOD YOU BOUGHT JUST DIDN'T LAST AND YOU DIDN'T HAVE MONEY TO GET MORE.: NEVER TRUE

## 2025-05-12 SDOH — ECONOMIC STABILITY: FOOD INSECURITY: WITHIN THE PAST 12 MONTHS, YOU WORRIED THAT YOUR FOOD WOULD RUN OUT BEFORE YOU GOT MONEY TO BUY MORE.: NEVER TRUE

## 2025-05-12 ASSESSMENT — ENCOUNTER SYMPTOMS
DEPRESSION: 0
LOSS OF SENSATION IN FEET: 0
OCCASIONAL FEELINGS OF UNSTEADINESS: 0

## 2025-05-12 ASSESSMENT — PAIN SCALES - GENERAL: PAINLEVEL_OUTOF10: 7

## 2025-05-12 ASSESSMENT — LIFESTYLE VARIABLES
HOW OFTEN DO YOU HAVE SIX OR MORE DRINKS ON ONE OCCASION: NEVER
SKIP TO QUESTIONS 9-10: 1
HOW MANY STANDARD DRINKS CONTAINING ALCOHOL DO YOU HAVE ON A TYPICAL DAY: PATIENT DOES NOT DRINK
HOW OFTEN DO YOU HAVE A DRINK CONTAINING ALCOHOL: NEVER
AUDIT-C TOTAL SCORE: 0

## 2025-05-12 NOTE — ASSESSMENT & PLAN NOTE
Uncontrolled  Has failed topiramate and zoniamide  Rx Aimovig 70 mg, PA started   Orders:    erenumab (Aimovig Autoinjector) 70 mg/mL injection; Inject 1 mL (70 mg) under the skin every 28 (twenty-eight) days.    Referral to Neurology; Future

## 2025-05-12 NOTE — PROGRESS NOTES
"Subjective   Patient ID: Anjali Chamorro is a 50 y.o. female who presents for medication (3 insulins and would like to talk to you about them.  Feels like she is bottoming out more than normal. /Feels like migraine medication isn't helping. /Since starting Mounjaro her acid reflex has been worse. ) and paperwork (Has paperwork for you to fill out for taking care of son. ).  Migraines  Worse than previous. She is having 2-3 migraines per month which last 3-7 days each and can be severe.     DM  For past 90 days Time in range 42% per CGM. Having lows between 9 PM and 1 AM (when sleeping - the meter wakes her up). She has done 2 shots of Mounjaro. She has noticed increase in nausea and acid reflux.           Patient Care Team:  Leann Tidwell,  as PCP - General  Leann Tidwell,  as PCP - [place] [employer] PCP  Leann Tidwell, DO as PCP - [facility] PCP  Carolynn Porter, PharmD as Pharmacist (Pharmacy)    Current Medications[1]    Objective     Visit Vitals  /60 (BP Location: Left arm, Patient Position: Sitting, BP Cuff Size: Large adult)   Pulse 94   Temp 35.9 °C (96.7 °F) (Temporal)   Resp 20   Ht 1.74 m (5' 8.5\")   Wt 92.5 kg (204 lb)   SpO2 96%   BMI 30.57 kg/m²   Smoking Status Never   BSA 2.11 m²        Constitutional: Well nourished, well developed, appears stated age  Eyes: no scleral icterus, no conjunctival injection  Respiratory: normal respiratory effort  Musculoskeletal: No gross deformities appreciated  Skin: Warm, dry.  Neurologic: Alert, CNs II-XII grossly intact..  Psych: Appropriate mood and affect.         Assessment & Plan  Uncontrolled type 2 diabetes mellitus with hyperglycemia  Using CGM  Having a lot of lows between 9 PM and 1 AM  Decrease Toujeo to 26 units at bedtime  Continue Mounjaro and Humalog  Continue working with clinical pharmacist          Migraine without aura and without status migrainosus, not intractable  Uncontrolled  Has failed topiramate and zoniamide  Rx " "Aimovig 70 mg, PA started   Orders:    erenumab (Aimovig Autoinjector) 70 mg/mL injection; Inject 1 mL (70 mg) under the skin every 28 (twenty-eight) days.    Referral to Neurology; Future         Follow up as scheduled.     Leann Tidwell DO         [1]   Current Outpatient Medications   Medication Sig Dispense Refill    baclofen (Lioresal) 10 mg tablet Take 1 tablet (10 mg) by mouth 3 times a day as needed for muscle spasms.      blood-glucose sensor (FreeStyle Gt 3 Plus Sensor) device Change sensor every 15 days 6 each 11    insulin lispro (HumaLOG KwikPen Insulin) 100 unit/mL injection Inject 32 Units under the skin 3 times a day before meals. 45 mL 6    meclizine (Antivert) 25 mg tablet TAKE 1 TABLET (25 mg) BY MOUTH THREE TIMES DAILY AS NEEDED FOR DIZZINESS 30 tablet 11    ondansetron (Zofran) 4 mg tablet TAKE 1 TABLET BY MOUTH EVERY 8 HOURS AS NEEDED FOR NAUSEA 30 tablet 11    pen needle, diabetic 34 gauge x 9/64\" needle Use with Humalog and Toujeo (4 times a day total) 300 each 5    tirzepatide (Mounjaro) 2.5 mg/0.5 mL pen injector Inject 2.5 mg under the skin every 7 days. 2 mL 1    Toujeo SoloStar U-300 Insulin 300 unit/mL (1.5 mL) injection Inject 35 Units under the skin once daily at bedtime. 13.5 mL 5    erenumab (Aimovig Autoinjector) 70 mg/mL injection Inject 1 mL (70 mg) under the skin every 28 (twenty-eight) days. 1 mL 11     No current facility-administered medications for this visit.     "

## 2025-05-12 NOTE — PATIENT INSTRUCTIONS
Thank you for choosing [facility] for your healthcare.   As always if you have any questions or concerns please do not hesitate to call our office at [phone] or through [facility].    Have a great day!  Leann Tidwell, DO

## 2025-05-12 NOTE — LETTER
May 12, 2025     Patient: Anjali Chamorro   YOB: 1975   Date of Visit: 5/12/2025       To Whom It May Concern:    Anjali Chamorro is currently under my medical care. She has debilitating migraines that I am working with her to get under control. She also has degenerative disc disease and recently had shoulder surgery which affect her mobility. Her son (Zaki Sanderson - who is also under my care) has a seizure disorder and currently needs 24/7 surveillance.        Sincerely,         Leann Tidwell, DO

## 2025-05-12 NOTE — ASSESSMENT & PLAN NOTE
Using CGM  Having a lot of lows between 9 PM and 1 AM  Decrease Toujeo to 26 units at bedtime  Continue Mounjaro and Humalog  Continue working with clinical pharmacist

## 2025-05-13 NOTE — TELEPHONE ENCOUNTER
Prior Auth for Aimovig approved.  Can you let patient know that she should be able to pick it up at her pharmacy?  There is a higher strength available if the 70 mg is not improving her migraines.  Thanks,  Leann Tidwell, DO

## 2025-05-16 ENCOUNTER — APPOINTMENT (OUTPATIENT)
Dept: PHARMACY | Facility: HOSPITAL | Age: 50
End: 2025-05-16
Payer: COMMERCIAL

## 2025-05-16 DIAGNOSIS — K21.9 GASTROESOPHAGEAL REFLUX DISEASE, UNSPECIFIED WHETHER ESOPHAGITIS PRESENT: Primary | ICD-10-CM

## 2025-05-16 DIAGNOSIS — E11.65 UNCONTROLLED TYPE 2 DIABETES MELLITUS WITH HYPERGLYCEMIA: ICD-10-CM

## 2025-05-16 RX ORDER — TIRZEPATIDE 5 MG/.5ML
5 INJECTION, SOLUTION SUBCUTANEOUS
Qty: 2 ML | Refills: 1 | Status: SHIPPED | OUTPATIENT
Start: 2025-05-16

## 2025-05-16 RX ORDER — HYDROGEN PEROXIDE 3 %
20 SOLUTION, NON-ORAL MISCELLANEOUS
Qty: 30 CAPSULE | Refills: 3 | Status: SHIPPED | OUTPATIENT
Start: 2025-05-16 | End: 2025-09-13

## 2025-05-16 NOTE — PROGRESS NOTES
Pharmacist Clinic: Diabetes Management  Anjali Chamorro is a 50 y.o. female was referred to Clinical Pharmacy Team for diabetes management.   Referring Provider: Leann Tidwell DO  - Last visit with referring provider: 1/7/25    Subjective     HPI    Current Diabetes Pharmacotherapy:    - Humalog 32 units with breakfast and 35 units with lunch and dinner  - Toujeo 26 units at bedtime  - Mounjaro 2.5 mg weekly - Wednesday (3 doses so far)    Social History:  Current diet:   - Never eats breakfast  - L: breakfast at lunch   - D: chili   - Tries not to buy sweets  - Diet soda  - Does not eat veggies; green bean and corn sometimes    Current exercise:  - None    Eye exam for this year?: yes - 2024  Foot exam for this year?: no    Current monitoring regimen:   Patient is using: continuous glucose monitor  Type of CGM: Gt 3    Reported blood sugars:   See APG Report Below    Any episodes of hypoglycemia? Yes    Adverse Effects: Some nausea, diarrhea; heart burn (has Zofran to help)            Objective     There were no vitals taken for this visit.    Allergies   Allergen Reactions    Prednisone Swelling    Aspirin Other     VOMITING    Famotidine Other     VOMITING    Insulin Aspart U-100 Other     VOMITING    Insulin Detemir Other    Insulin Glargine Nausea Only and Other     VOMITING    Metformin Other    Penicillins Hives and Angioedema    Pioglitazone Nausea Only and Other    Sodium Citrate-Citric Acid Other     VOMITING    Adhesive Tape-Silicones Rash    Chlorhexidine Rash    Povidone-Iodine Rash     TOPICAL APPLICATION       Historical Diabetes Pharmacotherapy:  - Lantus (vomiting)  - Levemir (vomiting)  - Trulicity (feet/legs to swell/constipation) 5/15/24-6/28/24  - Victoza (vomiting)   - Metformin (intolerance/vomiting)  - Pioglitazone (nausea)  - Jardiance (side effects) 5/15/24-6/28/24  - Glipizide ER 10 mg twice daily (did not bring down blood sugars) 5/15/24 - 11/15/24  - Glipzide IR 5 mg with breakfast  and 10 mg with dinner (did not bring down blood sugars) 11/15/25 - 12/30/25    SECONDARY PREVENTION  - Statin? No   - ACE-I/ARB? No  - Aspirin? No    Pertinent PMH Review:  - PMH of Pancreatitis: No  - PMH of Retinopathy: No  - PMH of Urinary Tract Infections: Yes  - PMH of Yeast Infections: Yes  - PMH of MTC: No    Lab Review  Lab Results   Component Value Date    BILITOT 1.1 12/31/2024    CALCIUM 9.0 12/31/2024    CO2 29 12/31/2024     12/31/2024    CREATININE 0.61 12/31/2024    GLUCOSE 116 (H) 12/31/2024    ALKPHOS 117 (H) 12/31/2024    K 4.1 12/31/2024    PROT 6.5 12/31/2024     12/31/2024    AST 25 12/31/2024    ALT 33 12/31/2024    BUN 20 12/31/2024    ANIONGAP 12 12/31/2024    ALBUMIN 4.2 12/31/2024    GFRF >90 04/26/2023     Lab Results   Component Value Date    TRIG 89 10/02/2024    CHOL 219 (H) 10/02/2024    HDL 48.9 10/02/2024     Lab Results   Component Value Date    HGBA1C 8.6 (H) 12/31/2024    HGBA1C 12.9 (H) 10/02/2024    HGBA1C 8.2 (A) 04/26/2023     The 10-year ASCVD risk score (Niurka MARQUEZ, et al., 2019) is: 2.4%    Values used to calculate the score:      Age: 50 years      Sex: Female      Is Non- : No      Diabetic: Yes      Tobacco smoker: No      Systolic Blood Pressure: 108 mmHg      Is BP treated: No      HDL Cholesterol: 48.9 mg/dL      Total Cholesterol: 219 mg/dL    Drug Interactions:  None    Affordability/Accessibility:  None with current regimen    Preferred Pharmacy:  [facility]    Assessment/Plan   Problem List Items Addressed This Visit       GERD (gastroesophageal reflux disease) - Primary    Relevant Medications    esomeprazole (NexIUM) 20 mg DR capsule    Uncontrolled type 2 diabetes mellitus with hyperglycemia (Chronic)    Relevant Medications    tirzepatide (Mounjaro) 5 mg/0.5 mL pen injector    Other Relevant Orders    Referral to Clinical Pharmacy       ASSESSMENT:  Patients diabetes is uncontrolled with most recent A1c of 8.0%.      Time in range has doubled since starting Mounjaro. Now 62% in target range. Has noticed an increase in low blood sugars. Dr. Tidwell decreased Toujeo to 26 units but still seems like she is going low. We will continue to keep pulling back on insulin. Will have her decrease Humalog to 28 units with breakfast and 30 units with lunch and dinner. Will also decrease Toujeo to 22 units HS. She states she is having some nausea and heart burn. Has Zofran which helps with nausea. She has taken Nexium in the past which has helped with heartburn but OTC is costly for her. Will send in script to help. I will also increase dose of Mounjaro as we pull back on insulin.    PLAN:  DECREASE Humalog to 28 units with breakfast and 30 units with lunch and dinner  DECREASE Toujeo to 22 units HS  INCREASE Mounjaro to 5 mg weekly  CONTINUE all other DM medications  Follow up with clinical pharmacist: 6/13/25 @ 10  Follow up with PCP: 7/24/25    Thank you,  Carolynn Porter, PharmD  Clinical Pharmacy Specialist  [phone]  [email or web address]     Continue all meds under the continuation of care with the referring provider and clinical pharmacy team.

## 2025-05-29 ENCOUNTER — TELEPHONE (OUTPATIENT)
Dept: PRIMARY CARE | Facility: CLINIC | Age: 50
End: 2025-05-29
Payer: COMMERCIAL

## 2025-05-29 DIAGNOSIS — R11.0 NAUSEA: ICD-10-CM

## 2025-05-29 RX ORDER — ONDANSETRON 4 MG/1
4 TABLET, FILM COATED ORAL EVERY 8 HOURS PRN
Qty: 30 TABLET | Refills: 11 | Status: SHIPPED | OUTPATIENT
Start: 2025-05-29

## 2025-05-29 NOTE — TELEPHONE ENCOUNTER
Called patient let her know about the nausea medication.     Also she said there was a lot in with the stool and also when she had wiped too.

## 2025-05-29 NOTE — TELEPHONE ENCOUNTER
I recommend a referral to GI for a possible colonoscopy for evaluation of the stool. Will put in referral if agreeable and she can call [phone] to schedule with GI in [place].   Thanks,  Leann Tidwell, DO

## 2025-05-29 NOTE — TELEPHONE ENCOUNTER
Patient called in wanting an appointment will not see anyone else but her provider.     Since Memorial Day vomiting possibly due to medications.    When went to the restroom today for bowel movement a bunch of blood came out.  Stomach pain is only other symptom.     Please advise

## 2025-05-29 NOTE — TELEPHONE ENCOUNTER
I sent in some nausea medication which she should take 3 times a day until her stomach is better.    Was there a blood when she wiped or was it all mixed in with the stool?    Thanks,  Leann Tidwell, DO

## 2025-05-30 NOTE — TELEPHONE ENCOUNTER
Spoke to patient she is agreeable to see GI but wants to see one from [facility]. She states she will find her own there and schedule.

## 2025-06-12 NOTE — PROGRESS NOTES
Pharmacist Clinic: Diabetes Management  Anjali Chamorro is a 50 y.o. female was referred to Clinical Pharmacy Team for diabetes management.   Referring Provider: Leann Tidwell DO  - Last visit with referring provider: 1/7/25    Subjective     HPI    Current Diabetes Pharmacotherapy:    - Humalog to 25 units with breakfast and 25 units with dinner   - Changed about a week ago d/t lows  - Toujeo to 22 units HS   - Stopped about 3 weeks ago d/t low blood sugars  - Mounjaro 5 mg weekly - Wednesday     Social History:  Current diet:   - Never eats breakfast  - L: breakfast at lunch   - D: chili   - Tries not to buy sweets  - Diet soda  - Does not eat veggies; green bean and corn sometimes  - Has been feeling tony faster    Current exercise:  - None    Eye exam for this year?: yes - 2024  Foot exam for this year?: no    Current monitoring regimen:   Patient is using: continuous glucose monitor  Type of CGM: Gt 3    Reported blood sugars:   See APG Report Below    Any episodes of hypoglycemia? Yes  - Around evening    Adverse Effects: Nausea/vomiting (started about 2 weeks ago); vomiting has went away        Objective     There were no vitals taken for this visit.    Allergies   Allergen Reactions    Prednisone Swelling    Aspirin Other     VOMITING    Famotidine Other     VOMITING    Insulin Aspart U-100 Other     VOMITING    Insulin Detemir Other    Insulin Glargine Nausea Only and Other     VOMITING    Metformin Other    Penicillins Hives and Angioedema    Pioglitazone Nausea Only and Other    Sodium Citrate-Citric Acid Other     VOMITING    Adhesive Tape-Silicones Rash    Chlorhexidine Rash    Povidone-Iodine Rash     TOPICAL APPLICATION       Historical Diabetes Pharmacotherapy:  - Lantus (vomiting)  - Levemir (vomiting)  - Trulicity (feet/legs to swell/constipation) 5/15/24-6/28/24  - Victoza (vomiting)   - Metformin (intolerance/vomiting)  - Pioglitazone (nausea)  - Jardiance (side effects) 5/15/24-6/28/24  -  Glipizide ER 10 mg twice daily (did not bring down blood sugars) 5/15/24 - 11/15/24  - Glipzide IR 5 mg with breakfast and 10 mg with dinner (did not bring down blood sugars) 11/15/25 - 12/30/25    SECONDARY PREVENTION  - Statin? No   - ACE-I/ARB? No  - Aspirin? No    Pertinent PMH Review:  - PMH of Pancreatitis: No  - PMH of Retinopathy: No  - PMH of Urinary Tract Infections: Yes  - PMH of Yeast Infections: Yes  - PMH of MTC: No    Lab Review  Lab Results   Component Value Date    BILITOT 1.1 12/31/2024    CALCIUM 9.0 12/31/2024    CO2 29 12/31/2024     12/31/2024    CREATININE 0.61 12/31/2024    GLUCOSE 116 (H) 12/31/2024    ALKPHOS 117 (H) 12/31/2024    K 4.1 12/31/2024    PROT 6.5 12/31/2024     12/31/2024    AST 25 12/31/2024    ALT 33 12/31/2024    BUN 20 12/31/2024    ANIONGAP 12 12/31/2024    ALBUMIN 4.2 12/31/2024    GFRF >90 04/26/2023     Lab Results   Component Value Date    TRIG 89 10/02/2024    CHOL 219 (H) 10/02/2024    HDL 48.9 10/02/2024     Lab Results   Component Value Date    HGBA1C 8.6 (H) 12/31/2024    HGBA1C 12.9 (H) 10/02/2024    HGBA1C 8.2 (A) 04/26/2023     The 10-year ASCVD risk score (Niurka MARQUEZ, et al., 2019) is: 2.4%    Values used to calculate the score:      Age: 50 years      Sex: Female      Is Non- : No      Diabetic: Yes      Tobacco smoker: No      Systolic Blood Pressure: 108 mmHg      Is BP treated: No      HDL Cholesterol: 48.9 mg/dL      Total Cholesterol: 219 mg/dL    Drug Interactions:  None    Affordability/Accessibility:  None with current regimen    Preferred Pharmacy:  [facility]    Assessment/Plan   Problem List Items Addressed This Visit       GERD (gastroesophageal reflux disease) - Primary    Relevant Medications    esomeprazole (NexIUM) 40 mg DR capsule    Uncontrolled type 2 diabetes mellitus with hyperglycemia (Chronic)    Relevant Medications    insulin lispro (HumaLOG KwikPen Insulin) 100 unit/mL pen    Other Relevant  Orders    Referral to Clinical Pharmacy     Other Visit Diagnoses         Nausea and vomiting, unspecified vomiting type        Relevant Medications    ondansetron ODT (Zofran-ODT) 4 mg disintegrating tablet            ASSESSMENT:  Patients diabetes is uncontrolled with most recent A1c of 8.0%.     Called office on 5/30 stating she has had vomiting. She states that the vomiting has stopped since she has been watching what she eats. She is seeing GI in July. She has stopped Toujeo due to lows and decreased Humalog. Seems to still be having some lows around evening/overnight. Will further decrease her dinner dose of Humalog to 20 units. Will hold off on increasing Mounjaro at this time. Of note, blood sugars are looking a lot better since adding Mounjaro. She also would like to increase her dose of Nexium from 20 mg to 40 mg as well as Zofran to 8 mg. Will send to pharmacy.      PLAN:  DECREASE Humalog to 25 units with breakfast and 20 unit with dinner  STOP Toujeo  CONTINUE all other DM medications  Follow up with clinical pharmacist: 7/1/25 @ 10:40   Follow up with PCP: 7/24/25    Thank you,  Carolynn Porter, PharmD  Clinical Pharmacy Specialist  [phone]  [email or web address]     Continue all meds under the continuation of care with the referring provider and clinical pharmacy team.

## 2025-06-13 ENCOUNTER — APPOINTMENT (OUTPATIENT)
Dept: PHARMACY | Facility: HOSPITAL | Age: 50
End: 2025-06-13
Payer: COMMERCIAL

## 2025-06-13 DIAGNOSIS — R11.2 NAUSEA AND VOMITING, UNSPECIFIED VOMITING TYPE: ICD-10-CM

## 2025-06-13 DIAGNOSIS — E11.65 UNCONTROLLED TYPE 2 DIABETES MELLITUS WITH HYPERGLYCEMIA: ICD-10-CM

## 2025-06-13 DIAGNOSIS — K21.9 GASTROESOPHAGEAL REFLUX DISEASE, UNSPECIFIED WHETHER ESOPHAGITIS PRESENT: Primary | ICD-10-CM

## 2025-06-13 RX ORDER — ONDANSETRON 4 MG/1
8 TABLET, ORALLY DISINTEGRATING ORAL EVERY 8 HOURS PRN
Qty: 60 TABLET | Refills: 3 | Status: SHIPPED | OUTPATIENT
Start: 2025-06-13 | End: 2025-07-13

## 2025-06-13 RX ORDER — ESOMEPRAZOLE MAGNESIUM 40 MG/1
40 CAPSULE, DELAYED RELEASE ORAL
Qty: 90 CAPSULE | Refills: 1 | Status: SHIPPED | OUTPATIENT
Start: 2025-06-13 | End: 2025-12-10

## 2025-06-13 RX ORDER — INSULIN LISPRO 100 [IU]/ML
INJECTION, SOLUTION INTRAVENOUS; SUBCUTANEOUS
Qty: 45 ML | Refills: 6 | Status: SHIPPED | OUTPATIENT
Start: 2025-06-13

## 2025-06-27 NOTE — PROGRESS NOTES
Pharmacist Clinic: Diabetes Management  Anjali Chamorro is a 50 y.o. female was referred to Clinical Pharmacy Team for diabetes management.   Referring Provider: Leann Tidwell DO  - Last visit with referring provider: 1/7/25    Subjective     HPI    Current Diabetes Pharmacotherapy:    - Humalog to 25 units with breakfast and 20 units with dinner  - Mounjaro 5 mg weekly - Wednesday     Social History:  Current diet:   - Never eats breakfast  - L: breakfast at lunch   - D: chili   - Tries not to buy sweets  - Diet soda  - Does not eat veggies; green bean and corn sometimes  - Has been feeling tony faster    Current exercise:  - None    Eye exam for this year?: yes - 2024  Foot exam for this year?: no    Current monitoring regimen:   Patient is using: continuous glucose monitor  Type of CGM: Gt 3    Reported blood sugars:   See APG Report Below    Any episodes of hypoglycemia? Yes  - Around evening    Adverse Effects: Nausea/vomiting (started about 2 weeks ago); vomiting has went away          Objective     There were no vitals taken for this visit.    Allergies   Allergen Reactions    Prednisone Swelling    Aspirin Other     VOMITING    Famotidine Other     VOMITING    Insulin Aspart U-100 Other     VOMITING    Insulin Detemir Other    Insulin Glargine Nausea Only and Other     VOMITING    Metformin Other    Penicillins Hives and Angioedema    Pioglitazone Nausea Only and Other    Sodium Citrate-Citric Acid Other     VOMITING    Adhesive Tape-Silicones Rash    Chlorhexidine Rash    Povidone-Iodine Rash     TOPICAL APPLICATION       Historical Diabetes Pharmacotherapy:  - Lantus (vomiting)  - Levemir (vomiting)  - Trulicity (feet/legs to swell/constipation) 5/15/24-6/28/24  - Victoza (vomiting)   - Metformin (intolerance/vomiting)  - Pioglitazone (nausea)  - Jardiance (side effects) 5/15/24-6/28/24  - Glipizide ER 10 mg twice daily (did not bring down blood sugars) 5/15/24 - 11/15/24  - Glipzide IR 5 mg with  breakfast and 10 mg with dinner (did not bring down blood sugars) 11/15/25 - 12/30/25    SECONDARY PREVENTION  - Statin? No   - ACE-I/ARB? No  - Aspirin? No    Pertinent PMH Review:  - PMH of Pancreatitis: No  - PMH of Retinopathy: No  - PMH of Urinary Tract Infections: Yes  - PMH of Yeast Infections: Yes  - PMH of MTC: No    Lab Review  Lab Results   Component Value Date    BILITOT 1.1 12/31/2024    CALCIUM 9.0 12/31/2024    CO2 29 12/31/2024     12/31/2024    CREATININE 0.61 12/31/2024    GLUCOSE 116 (H) 12/31/2024    ALKPHOS 117 (H) 12/31/2024    K 4.1 12/31/2024    PROT 6.5 12/31/2024     12/31/2024    AST 25 12/31/2024    ALT 33 12/31/2024    BUN 20 12/31/2024    ANIONGAP 12 12/31/2024    ALBUMIN 4.2 12/31/2024    GFRF >90 04/26/2023     Lab Results   Component Value Date    TRIG 89 10/02/2024    CHOL 219 (H) 10/02/2024    HDL 48.9 10/02/2024     Lab Results   Component Value Date    HGBA1C 8.6 (H) 12/31/2024    HGBA1C 12.9 (H) 10/02/2024    HGBA1C 8.2 (A) 04/26/2023     The 10-year ASCVD risk score (Niurka MARQUEZ, et al., 2019) is: 2.4%    Values used to calculate the score:      Age: 50 years      Sex: Female      Is Non- : No      Diabetic: Yes      Tobacco smoker: No      Systolic Blood Pressure: 108 mmHg      Is BP treated: No      HDL Cholesterol: 48.9 mg/dL      Total Cholesterol: 219 mg/dL    Drug Interactions:  None    Affordability/Accessibility:  None with current regimen    Preferred Pharmacy:  [facility]    Assessment/Plan   Problem List Items Addressed This Visit    None        ASSESSMENT:  Patients diabetes is uncontrolled with most recent A1c of 8.0% (POC 4/28)     increase mounjaro?   Stopped toujeo??  Decrease pm dose to 16 units, am dose to 22 units    PLAN:    CONTINUE all other DM medications  Follow up with clinical pharmacist:   Follow up with PCP: 7/24/25    Thank you,  Carolynn Porter, PharmD  Clinical Pharmacy  Specialist  [phone]  [email or web address]     Continue all meds under the continuation of care with the referring provider and clinical pharmacy team.

## 2025-06-30 ENCOUNTER — TELEPHONE (OUTPATIENT)
Dept: PRIMARY CARE | Facility: CLINIC | Age: 50
End: 2025-06-30

## 2025-06-30 ENCOUNTER — APPOINTMENT (OUTPATIENT)
Dept: PHARMACY | Facility: HOSPITAL | Age: 50
End: 2025-06-30
Payer: COMMERCIAL

## 2025-06-30 DIAGNOSIS — E11.65 UNCONTROLLED TYPE 2 DIABETES MELLITUS WITH HYPERGLYCEMIA: ICD-10-CM

## 2025-06-30 RX ORDER — SEMAGLUTIDE 0.68 MG/ML
0.25 INJECTION, SOLUTION SUBCUTANEOUS
Qty: 3 ML | Refills: 1 | Status: SHIPPED | OUTPATIENT
Start: 2025-06-30 | End: 2025-07-03 | Stop reason: WASHOUT

## 2025-06-30 NOTE — TELEPHONE ENCOUNTER
Prior authorization request received via fax for  Ozempic  Form given to: PLACED IN LEAD MA'S BOX ON  6/30/25

## 2025-07-01 ENCOUNTER — APPOINTMENT (OUTPATIENT)
Dept: PHARMACY | Facility: HOSPITAL | Age: 50
End: 2025-07-01
Payer: COMMERCIAL

## 2025-07-03 ENCOUNTER — TELEPHONE (OUTPATIENT)
Facility: CLINIC | Age: 50
End: 2025-07-03

## 2025-07-03 ENCOUNTER — TELEPHONE (OUTPATIENT)
Dept: SURGERY | Facility: CLINIC | Age: 50
End: 2025-07-03

## 2025-07-03 ENCOUNTER — APPOINTMENT (OUTPATIENT)
Facility: CLINIC | Age: 50
End: 2025-07-03
Payer: COMMERCIAL

## 2025-07-03 ENCOUNTER — TELEPHONE (OUTPATIENT)
Dept: PHARMACY | Facility: HOSPITAL | Age: 50
End: 2025-07-03

## 2025-07-03 VITALS
SYSTOLIC BLOOD PRESSURE: 118 MMHG | BODY MASS INDEX: 28.88 KG/M2 | HEART RATE: 94 BPM | WEIGHT: 195 LBS | HEIGHT: 69 IN | DIASTOLIC BLOOD PRESSURE: 80 MMHG

## 2025-07-03 DIAGNOSIS — E66.01 SEVERE OBESITY WITH SERIOUS COMORBIDITY AND BODY MASS INDEX (BMI) 120% OF 95TH PERCENTILE TO LESS THAN 140% OF 95TH PERCENTILE FOR AGE IN PEDIATRIC PATIENT, UNSPECIFIED OBESITY TYPE: ICD-10-CM

## 2025-07-03 DIAGNOSIS — Z68.55 SEVERE OBESITY WITH SERIOUS COMORBIDITY AND BODY MASS INDEX (BMI) 120% OF 95TH PERCENTILE TO LESS THAN 140% OF 95TH PERCENTILE FOR AGE IN PEDIATRIC PATIENT, UNSPECIFIED OBESITY TYPE: ICD-10-CM

## 2025-07-03 DIAGNOSIS — K21.00 GASTROESOPHAGEAL REFLUX DISEASE WITH ESOPHAGITIS WITHOUT HEMORRHAGE: Primary | ICD-10-CM

## 2025-07-03 DIAGNOSIS — K58.1 IRRITABLE BOWEL SYNDROME WITH CONSTIPATION: ICD-10-CM

## 2025-07-03 DIAGNOSIS — K21.9 GASTROESOPHAGEAL REFLUX DISEASE, UNSPECIFIED WHETHER ESOPHAGITIS PRESENT: ICD-10-CM

## 2025-07-03 DIAGNOSIS — K59.04 CHRONIC IDIOPATHIC CONSTIPATION: ICD-10-CM

## 2025-07-03 DIAGNOSIS — E11.65 UNCONTROLLED TYPE 2 DIABETES MELLITUS WITH HYPERGLYCEMIA: ICD-10-CM

## 2025-07-03 PROCEDURE — 1036F TOBACCO NON-USER: CPT | Performed by: INTERNAL MEDICINE

## 2025-07-03 PROCEDURE — 3074F SYST BP LT 130 MM HG: CPT | Performed by: INTERNAL MEDICINE

## 2025-07-03 PROCEDURE — 3079F DIAST BP 80-89 MM HG: CPT | Performed by: INTERNAL MEDICINE

## 2025-07-03 PROCEDURE — 3008F BODY MASS INDEX DOCD: CPT | Performed by: INTERNAL MEDICINE

## 2025-07-03 PROCEDURE — 99204 OFFICE O/P NEW MOD 45 MIN: CPT | Performed by: INTERNAL MEDICINE

## 2025-07-03 RX ORDER — ESOMEPRAZOLE MAGNESIUM 40 MG/1
40 CAPSULE, DELAYED RELEASE ORAL
Qty: 90 CAPSULE | Refills: 1 | Status: SHIPPED | OUTPATIENT
Start: 2025-07-03 | End: 2025-09-01

## 2025-07-03 RX ORDER — POLYETHYLENE GLYCOL 3350, SODIUM SULFATE ANHYDROUS, SODIUM BICARBONATE, SODIUM CHLORIDE, POTASSIUM CHLORIDE 236; 22.74; 6.74; 5.86; 2.97 G/4L; G/4L; G/4L; G/4L; G/4L
4000 POWDER, FOR SOLUTION ORAL ONCE
Qty: 4000 ML | Refills: 0 | Status: SHIPPED | OUTPATIENT
Start: 2025-07-03 | End: 2025-07-03

## 2025-07-03 RX ORDER — TIRZEPATIDE 5 MG/.5ML
5 INJECTION, SOLUTION SUBCUTANEOUS
Qty: 2 ML | Refills: 3 | Status: SHIPPED | OUTPATIENT
Start: 2025-07-03

## 2025-07-03 RX ORDER — LUBIPROSTONE 8 UG/1
8 CAPSULE ORAL
Qty: 60 CAPSULE | Refills: 3 | Status: SHIPPED | OUTPATIENT
Start: 2025-07-03

## 2025-07-03 NOTE — TELEPHONE ENCOUNTER
Patient calls and states that she would like to continue on Mounjaro. [facility] recommended she take it on an empty stomach in the morning. She tried it yesterday and worked. Will discontinue ozempic and have her keep on Mounjaro.    Carolynn Porter, PharmD  Clinical Pharmacy Specialist - Primary Care  [phone]  [email or web address]

## 2025-07-03 NOTE — ASSESSMENT & PLAN NOTE
Slow transit constipation suspected.  Severe diarrhea on lowest dose of Linzess.  Will try low-dose Amitiza.  Orders:    Colonoscopy Diagnostic; Future    polyethylene glycol (GoLYTELY) 236-22.74-6.74 -5.86 gram solution; Take 4,000 mL by mouth 1 time for 1 dose. Take as directed in instructions from office.    lubiprostone (Amitiza) 8 mcg capsule; Take 1 capsule (8 mcg) by mouth 2 times daily (morning and late afternoon).    Follow Up In Gastroenterology; Future

## 2025-07-03 NOTE — ASSESSMENT & PLAN NOTE
Reportedly refractory to twice daily proton pump inhibition.  EGD planned to exclude complications of reflux.  May benefit from ambulatory pH study/impedance to further evaluate for clinically significant acid reflux contributing to her symptoms.  We will resume twice daily dosing of her Nexium 40 mg before meals.  Orders:    Esophagogastroduodenoscopy (EGD); Future    Follow Up In Gastroenterology; Future

## 2025-07-03 NOTE — H&P (VIEW-ONLY)
[facility]    ASSESSMENT and PLAN:            Assessment & Plan  Gastroesophageal reflux disease with esophagitis without hemorrhage  Reportedly refractory to twice daily proton pump inhibition.  EGD planned to exclude complications of reflux.  May benefit from ambulatory pH study/impedance to further evaluate for clinically significant acid reflux contributing to her symptoms.  We will resume twice daily dosing of her Nexium 40 mg before meals.  Orders:    Esophagogastroduodenoscopy (EGD); Future    Follow Up In Gastroenterology; Future    Chronic idiopathic constipation  Slow transit constipation suspected.  Severe diarrhea on lowest dose of Linzess.  Will try low-dose Amitiza.  Orders:    Colonoscopy Diagnostic; Future    polyethylene glycol (GoLYTELY) 236-22.74-6.74 -5.86 gram solution; Take 4,000 mL by mouth 1 time for 1 dose. Take as directed in instructions from office.    lubiprostone (Amitiza) 8 mcg capsule; Take 1 capsule (8 mcg) by mouth 2 times daily (morning and late afternoon).    Follow Up In Gastroenterology; Future    Irritable bowel syndrome with constipation  Bright red blood per rectum primarily with straining, and hemorrhoids suspected but no colonoscopy for many years and thus plan is to exclude alternative pathology.  Benefits risks alternatives of monitored anesthesia care and EGD and colonoscopy were discussed and informed consent obtained.  We will give her a GoLytely bowel preparation.  Orders:    Colonoscopy Diagnostic; Future    polyethylene glycol (GoLYTELY) 236-22.74-6.74 -5.86 gram solution; Take 4,000 mL by mouth 1 time for 1 dose. Take as directed in instructions from office.    Follow Up In Gastroenterology; Future         I spent a total of 35 minutes on the date of the service which included preparing to see the patient, face-to-face patient care, completing clinical documentation, obtaining and/or reviewing separately obtained history, performing a medically  appropriate examination, counseling and educating the patient/family/caregiver, communicating with other HCPs (not separately reported), independently interpreting results (not separately reported), communicating results to the patient/family/caregiver, and care coordination (not separately reported)       Khoa Ruiz III, MD, MHA, FACP, FACG, JUHI, MAYTEF    Attending Physician, Gastroenterology    [facility]            Subjective   HISTORY OF PRESENT ILLNESS:     Chief Complaint  New Patient Visit (Blood in stool and other issues - Saw Dr. Balderas - 3/13/2020 - Colon 2013, EGD 2017)    History Of Present Illness:    Anjali Chamorro is a 50 y.o. female with a significant past medical history of IBS-C, GERD, depression, anxiety, migraine headaches who presents for consultation requested by her primary care provider (Leann Tidwell DO) for the evaluation of blood in her stools.  Describes a remote diagnosis of IBS.  States a brief trial of Linzess caused severe diarrhea and has not been on anything for her constipation bloating and gas since.  Has lower abdominal discomfort, crampy in nature with associated abdominal distention.  Has some bowel infrequency with several days between spontaneously passed bowel movements, and when she does pass a bowel movement it is sometimes very large and will see bright red blood per rectum.  Has had heartburn/reflux symptoms for years that did not improve even when taking maximal dose of PPI twice daily.  Weight has been up and down, but she is concerned about her obesity.  She is interested in bariatric surgery and would like a referral for this.     Patient denies any N/V, dysphagia, odynophagia, constipation, hematemesis, melena, or weight loss.    Endoscopy History:    Colonoscopy 5 years ago    Review of systems:   Review of Systems    I performed a complete 10 point review of systems and it is negative except as  noted in HPI or above.      PAST HISTORIES:       Past Medical History:  Problem List[1]  She has a past medical history of Allergic, Anxiety, Body mass index (BMI) 26.0-26.9, adult (06/23/2020), Burn of unspecified degree of unspecified hand, unspecified site, initial encounter, Candidiasis of skin and nail (04/14/2021), Candidiasis, unspecified (11/01/2019), Depression, Diabetes mellitus (Multi), Encounter for follow-up examination after completed treatment for conditions other than malignant neoplasm (12/05/2019), Encounter for other preprocedural examination (06/08/2021), Encounter for other specified surgical aftercare (12/05/2019), GERD (gastroesophageal reflux disease), Headache, History of neck surgery (07/04/2023), Mixed irritable bowel syndrome, Muscle weakness (generalized) (12/12/2019), Nausea, Other injury of unspecified body region, initial encounter (12/08/2020), Other specified counseling, Other specified counseling (10/31/2019), Other specified disorders of muscle (12/12/2019), Otitis media, unspecified, right ear, Periapical abscess without sinus (12/09/2019), Personal history of other complications of pregnancy, childbirth and the puerperium, Personal history of other diseases of the digestive system, Personal history of other diseases of the digestive system (12/18/2020), Personal history of other diseases of the digestive system (03/11/2020), Personal history of other diseases of the female genital tract (10/31/2019), Personal history of other diseases of the female genital tract (10/31/2019), Personal history of other diseases of the musculoskeletal system and connective tissue, Personal history of other diseases of the respiratory system, Personal history of other diseases of the respiratory system, Personal history of other endocrine, nutritional and metabolic disease, Personal history of other endocrine, nutritional and metabolic disease, Personal history of other endocrine, nutritional and  metabolic disease, Personal history of other endocrine, nutritional and metabolic disease (12/15/2020), Personal history of other infectious and parasitic diseases, Personal history of other infectious and parasitic diseases, Personal history of other infectious and parasitic diseases (02/10/2022), Personal history of other specified conditions, Personal history of other specified conditions, Personal history of other specified conditions (08/14/2021), Personal history of other specified conditions (04/03/2017), Personal history of other specified conditions (10/24/2018), Personal history of other specified conditions (04/14/2021), Personal history of other specified conditions, Personal history of urinary (tract) infections, Personal history of urinary (tract) infections (04/14/2021), S/P BSO (bilateral salpingo-oophorectomy) (07/04/2023), S/p partial hysterectomy with remaining cervical stump (07/04/2023), Sprain of unspecified part of left wrist and hand, initial encounter (07/12/2020), Strain of muscle, fascia and tendon at neck level, initial encounter, Unspecified injury of left shoulder and upper arm, initial encounter (07/12/2020), Unspecified sprain of left wrist, initial encounter (07/12/2020), and Urinary tract infection, site not specified (07/19/2021).    Past Surgical History:  She has a past surgical history that includes Tubal ligation (09/06/2016); Ankle surgery (09/06/2016); Foot surgery (09/06/2016); Hysterectomy (09/06/2016); Neck surgery (09/06/2016); Other surgical history (04/14/2021); Other surgical history (10/14/2021); Other surgical history (10/22/2018); Simple mastectomy (Bilateral, 10/2019); Colonoscopy (09/30/2013); Esophagogastroduodenoscopy (12/04/2013); Cholecystectomy; Back surgery; and Breast surgery.      Social History:  She reports that she has never smoked. She has never been exposed to tobacco smoke. She has never used smokeless tobacco. She reports that she does not drink  "alcohol and does not use drugs.    Family History:  No known family history of GI disease, specifically denies any family history of pancreatitis, Crohn's, colon cancer, gastroesophageal cancer, or ulcerative colitis.    Family History[2]     Allergies:  Prednisone, Aspirin, Famotidine, Insulin aspart u-100, Insulin detemir, Insulin glargine, Metformin, Penicillins, Pioglitazone, Sodium citrate-citric acid, Adhesive tape-silicones, Chlorhexidine, and Povidone-iodine      Objective   OBJECTIVE:       Last Recorded Vitals:  Vitals:    07/03/25 0954   BP: 118/80   Pulse: 94   Weight: 88.5 kg (195 lb)   Height: 1.74 m (5' 8.5\")     /80   Pulse 94   Ht 1.74 m (5' 8.5\")   Wt 88.5 kg (195 lb)   BMI 29.22 kg/m²      Physical Exam:    Physical Exam  Physical Exam:  Pleasant moderately obese female in no apparent distress  Alert and oriented x 3   HEENT: Normocephalic atraumatic.  Extraocular movements intact.  No scleral icterus.  Oropharynx slightly dry.  Clear no exudate.  Neck: Supple, full range of movement  CV: RRR, no murmurs appreciated  Lungs: CTA bilaterally  Abd: soft, obese, normal active bowel sounds, NT, ND   Ext: no lower extremity edema, cyanosis or clubbing  Skin: No jaundice or rashes       Home Medications:  Prior to Admission medications    Medication Sig Start Date End Date Taking? Authorizing Provider   blood-glucose sensor (FreeStyle Gt 3 Plus Sensor) device Change sensor every 15 days 2/24/25   Virginia M Factor, DO   erenumab (Aimovig Autoinjector) 70 mg/mL injection Inject 1 mL (70 mg) under the skin every 28 (twenty-eight) days. 5/12/25   Virginia M Factor, DO   esomeprazole (NexIUM) 40 mg DR capsule Take 1 capsule (40 mg) by mouth once daily in the morning. Take before meals. Do not open capsule. 6/13/25 12/10/25  Virginia M Factor, DO   insulin lispro (HumaLOG KwikPen Insulin) 100 unit/mL pen Inject 25 units with breakfast and 20 units with dinner 6/13/25   Virginia M Factor, DO " "  lubiprostone (Amitiza) 8 mcg capsule Take 1 capsule (8 mcg) by mouth 2 times daily (morning and late afternoon). 7/3/25   Khoa Ruiz MD   meclizine (Antivert) 25 mg tablet TAKE 1 TABLET (25 mg) BY MOUTH THREE TIMES DAILY AS NEEDED FOR DIZZINESS 10/30/24   Virginia M Factor, DO   ondansetron ODT (Zofran-ODT) 4 mg disintegrating tablet Dissolve 2 tablets (8 mg) in the mouth every 8 hours if needed for nausea or vomiting. 6/13/25 7/13/25  Virginia M Factor, DO   pen needle, diabetic 34 gauge x 9/64\" needle Use with Humalog and Toujeo (4 times a day total) 2/24/25   Virginia M Factor, DO   polyethylene glycol (GoLYTELY) 236-22.74-6.74 -5.86 gram solution Take 4,000 mL by mouth 1 time for 1 dose. Take as directed in instructions from office. 7/3/25 7/3/25  Khoa Ruiz MD   tirzepatide (Mounjaro) 5 mg/0.5 mL pen injector Inject 5 mg under the skin every 7 days. 7/3/25   Virginia M Factor, DO   semaglutide (Ozempic) 0.25 mg or 0.5 mg (2 mg/3 mL) pen injector Inject 0.25 mg under the skin every 7 days. 6/30/25 7/3/25  [facility], DO   tirzepatide (Mounjaro) 5 mg/0.5 mL pen injector Inject 5 mg under the skin every 7 days. 5/16/25 6/30/25  Virginia M Factor, DO         Relevant Results Recent labs reviewed in the EMR.    Lab Results   Component Value Date/Time    WBC 5.2 10/02/2024 1048    HGB 15.0 10/02/2024 1048    HGB 14.9 04/26/2023 0659    HGB 15.0 02/10/2022 1155    HGB 13.3 05/29/2020 0840    MCV 88 10/02/2024 1048     10/02/2024 1048     04/26/2023 0659     02/10/2022 1155     05/29/2020 0840    [id number] 411 10/02/2024 1048       Lab Results   Component Value Date/Time     12/31/2024 0843    K 4.1 12/31/2024 0843     12/31/2024 0843    BUN 20 12/31/2024 0843    CREATININE 0.61 12/31/2024 0843    CREATININE 0.55 10/02/2024 1048       Lab Results   Component Value Date/Time    BILITOT 1.1 12/31/2024 0843    BILITOT 0.6 10/02/2024 1048    BILITOT 0.6 " 04/26/2023 0659    BILITOT 0.7 06/15/2022 0759    BILITOT 0.8 02/10/2022 1155    ALKPHOS 117 (H) 12/31/2024 0843    ALKPHOS 165 (H) 10/02/2024 1048    ALKPHOS 116 (H) 04/26/2023 0659    ALKPHOS 126 (H) 06/15/2022 0759    ALKPHOS 183 (H) 02/10/2022 1155    AST 25 12/31/2024 0843    AST 30 10/02/2024 1048    AST 15 04/26/2023 0659    AST 25 06/15/2022 0759    AST 15 02/10/2022 1155    ALT 33 12/31/2024 0843    ALT 31 10/02/2024 1048    ALT 19 04/26/2023 0659    ALT 31 06/15/2022 0759    ALT 22 02/10/2022 1155         Radiology: Imaging reviewed in the EMR.  Imaging  No results found.    Cardiology, Vascular, and Other Imaging  No other imaging results found for the past 7 days                 [1]   Patient Active Problem List  Diagnosis    Generalized anxiety disorder    GERD (gastroesophageal reflux disease)    History of claustrophobia    Depression, major, single episode, moderate (Multi)    Chronic radicular low back pain    Chronic neck pain    Migrainous headache without aura    Plantar fascial fibromatosis of left foot    Uncontrolled type 2 diabetes mellitus with hyperglycemia    Vitamin B12 deficiency    Vitamin D deficiency    Overweight (BMI 25.0-29.9)    History of bilateral mastectomy    Chronic idiopathic constipation    Irritable bowel syndrome with constipation    Severe obesity with serious comorbidity and body mass index (BMI) 120% of 95th percentile to less than 140% of 95th percentile for age in pediatric patient   [2]   Family History  Problem Relation Name Age of Onset    Diabetes Mother      Alcohol abuse Mother Yohana Alvarez     COPD Mother Yohana Alvarez     Breast cancer Mother Yohana Alvarez     Alcohol abuse Father Kory Alvarez     COPD Father Kory Alvarez     Heart disease Father Kory Alvarez     Asthma Paternal Grandmother Kory Olliethom  Ellen Berisford     Cancer Maternal Grandfather [facility] Longoria     Diabetes Maternal Grandfather [facility]     Drug abuse Brother  Jayant Longoria     Alcohol abuse Father Kory Alvarez     COPD Father Kory Alvarez     Heart disease Father Kory Alvarez     Asthma Paternal Grandmother Kory Alvarez     Cancer Maternal Grandfather Ivory Longoria     Diabetes Maternal Grandfather Ivory Longoria     Drug abuse Brother Jayant Longoria

## 2025-07-03 NOTE — PROGRESS NOTES
[facility]    ASSESSMENT and PLAN:            Assessment & Plan  Gastroesophageal reflux disease with esophagitis without hemorrhage  Reportedly refractory to twice daily proton pump inhibition.  EGD planned to exclude complications of reflux.  May benefit from ambulatory pH study/impedance to further evaluate for clinically significant acid reflux contributing to her symptoms.  We will resume twice daily dosing of her Nexium 40 mg before meals.  Orders:    Esophagogastroduodenoscopy (EGD); Future    Follow Up In Gastroenterology; Future    Chronic idiopathic constipation  Slow transit constipation suspected.  Severe diarrhea on lowest dose of Linzess.  Will try low-dose Amitiza.  Orders:    Colonoscopy Diagnostic; Future    polyethylene glycol (GoLYTELY) 236-22.74-6.74 -5.86 gram solution; Take 4,000 mL by mouth 1 time for 1 dose. Take as directed in instructions from office.    lubiprostone (Amitiza) 8 mcg capsule; Take 1 capsule (8 mcg) by mouth 2 times daily (morning and late afternoon).    Follow Up In Gastroenterology; Future    Irritable bowel syndrome with constipation  Bright red blood per rectum primarily with straining, and hemorrhoids suspected but no colonoscopy for many years and thus plan is to exclude alternative pathology.  Benefits risks alternatives of monitored anesthesia care and EGD and colonoscopy were discussed and informed consent obtained.  We will give her a GoLytely bowel preparation.  Orders:    Colonoscopy Diagnostic; Future    polyethylene glycol (GoLYTELY) 236-22.74-6.74 -5.86 gram solution; Take 4,000 mL by mouth 1 time for 1 dose. Take as directed in instructions from office.    Follow Up In Gastroenterology; Future         I spent a total of 35 minutes on the date of the service which included preparing to see the patient, face-to-face patient care, completing clinical documentation, obtaining and/or reviewing separately obtained history, performing a medically  appropriate examination, counseling and educating the patient/family/caregiver, communicating with other HCPs (not separately reported), independently interpreting results (not separately reported), communicating results to the patient/family/caregiver, and care coordination (not separately reported)       Khoa Ruiz III, MD, MHA, FACP, FACG, JUHI, MAYTEF    Attending Physician, Gastroenterology    [facility]            Subjective   HISTORY OF PRESENT ILLNESS:     Chief Complaint  New Patient Visit (Blood in stool and other issues - Saw Dr. Balderas - 3/13/2020 - Colon 2013, EGD 2017)    History Of Present Illness:    Anjali Chamorro is a 50 y.o. female with a significant past medical history of IBS-C, GERD, depression, anxiety, migraine headaches who presents for consultation requested by her primary care provider (Leann Tidwell DO) for the evaluation of blood in her stools.  Describes a remote diagnosis of IBS.  States a brief trial of Linzess caused severe diarrhea and has not been on anything for her constipation bloating and gas since.  Has lower abdominal discomfort, crampy in nature with associated abdominal distention.  Has some bowel infrequency with several days between spontaneously passed bowel movements, and when she does pass a bowel movement it is sometimes very large and will see bright red blood per rectum.  Has had heartburn/reflux symptoms for years that did not improve even when taking maximal dose of PPI twice daily.  Weight has been up and down, but she is concerned about her obesity.  She is interested in bariatric surgery and would like a referral for this.     Patient denies any N/V, dysphagia, odynophagia, constipation, hematemesis, melena, or weight loss.    Endoscopy History:    Colonoscopy 5 years ago    Review of systems:   Review of Systems    I performed a complete 10 point review of systems and it is negative except as  noted in HPI or above.      PAST HISTORIES:       Past Medical History:  Problem List[1]  She has a past medical history of Allergic, Anxiety, Body mass index (BMI) 26.0-26.9, adult (06/23/2020), Burn of unspecified degree of unspecified hand, unspecified site, initial encounter, Candidiasis of skin and nail (04/14/2021), Candidiasis, unspecified (11/01/2019), Depression, Diabetes mellitus (Multi), Encounter for follow-up examination after completed treatment for conditions other than malignant neoplasm (12/05/2019), Encounter for other preprocedural examination (06/08/2021), Encounter for other specified surgical aftercare (12/05/2019), GERD (gastroesophageal reflux disease), Headache, History of neck surgery (07/04/2023), Mixed irritable bowel syndrome, Muscle weakness (generalized) (12/12/2019), Nausea, Other injury of unspecified body region, initial encounter (12/08/2020), Other specified counseling, Other specified counseling (10/31/2019), Other specified disorders of muscle (12/12/2019), Otitis media, unspecified, right ear, Periapical abscess without sinus (12/09/2019), Personal history of other complications of pregnancy, childbirth and the puerperium, Personal history of other diseases of the digestive system, Personal history of other diseases of the digestive system (12/18/2020), Personal history of other diseases of the digestive system (03/11/2020), Personal history of other diseases of the female genital tract (10/31/2019), Personal history of other diseases of the female genital tract (10/31/2019), Personal history of other diseases of the musculoskeletal system and connective tissue, Personal history of other diseases of the respiratory system, Personal history of other diseases of the respiratory system, Personal history of other endocrine, nutritional and metabolic disease, Personal history of other endocrine, nutritional and metabolic disease, Personal history of other endocrine, nutritional and  metabolic disease, Personal history of other endocrine, nutritional and metabolic disease (12/15/2020), Personal history of other infectious and parasitic diseases, Personal history of other infectious and parasitic diseases, Personal history of other infectious and parasitic diseases (02/10/2022), Personal history of other specified conditions, Personal history of other specified conditions, Personal history of other specified conditions (08/14/2021), Personal history of other specified conditions (04/03/2017), Personal history of other specified conditions (10/24/2018), Personal history of other specified conditions (04/14/2021), Personal history of other specified conditions, Personal history of urinary (tract) infections, Personal history of urinary (tract) infections (04/14/2021), S/P BSO (bilateral salpingo-oophorectomy) (07/04/2023), S/p partial hysterectomy with remaining cervical stump (07/04/2023), Sprain of unspecified part of left wrist and hand, initial encounter (07/12/2020), Strain of muscle, fascia and tendon at neck level, initial encounter, Unspecified injury of left shoulder and upper arm, initial encounter (07/12/2020), Unspecified sprain of left wrist, initial encounter (07/12/2020), and Urinary tract infection, site not specified (07/19/2021).    Past Surgical History:  She has a past surgical history that includes Tubal ligation (09/06/2016); Ankle surgery (09/06/2016); Foot surgery (09/06/2016); Hysterectomy (09/06/2016); Neck surgery (09/06/2016); Other surgical history (04/14/2021); Other surgical history (10/14/2021); Other surgical history (10/22/2018); Simple mastectomy (Bilateral, 10/2019); Colonoscopy (09/30/2013); Esophagogastroduodenoscopy (12/04/2013); Cholecystectomy; Back surgery; and Breast surgery.      Social History:  She reports that she has never smoked. She has never been exposed to tobacco smoke. She has never used smokeless tobacco. She reports that she does not drink  "alcohol and does not use drugs.    Family History:  No known family history of GI disease, specifically denies any family history of pancreatitis, Crohn's, colon cancer, gastroesophageal cancer, or ulcerative colitis.    Family History[2]     Allergies:  Prednisone, Aspirin, Famotidine, Insulin aspart u-100, Insulin detemir, Insulin glargine, Metformin, Penicillins, Pioglitazone, Sodium citrate-citric acid, Adhesive tape-silicones, Chlorhexidine, and Povidone-iodine      Objective   OBJECTIVE:       Last Recorded Vitals:  Vitals:    07/03/25 0954   BP: 118/80   Pulse: 94   Weight: 88.5 kg (195 lb)   Height: 1.74 m (5' 8.5\")     /80   Pulse 94   Ht 1.74 m (5' 8.5\")   Wt 88.5 kg (195 lb)   BMI 29.22 kg/m²      Physical Exam:    Physical Exam  Physical Exam:  Pleasant moderately obese female in no apparent distress  Alert and oriented x 3   HEENT: Normocephalic atraumatic.  Extraocular movements intact.  No scleral icterus.  Oropharynx slightly dry.  Clear no exudate.  Neck: Supple, full range of movement  CV: RRR, no murmurs appreciated  Lungs: CTA bilaterally  Abd: soft, obese, normal active bowel sounds, NT, ND   Ext: no lower extremity edema, cyanosis or clubbing  Skin: No jaundice or rashes       Home Medications:  Prior to Admission medications    Medication Sig Start Date End Date Taking? Authorizing Provider   blood-glucose sensor (FreeStyle Gt 3 Plus Sensor) device Change sensor every 15 days 2/24/25   Virginia M Factor, DO   erenumab (Aimovig Autoinjector) 70 mg/mL injection Inject 1 mL (70 mg) under the skin every 28 (twenty-eight) days. 5/12/25   Virginia M Factor, DO   esomeprazole (NexIUM) 40 mg DR capsule Take 1 capsule (40 mg) by mouth once daily in the morning. Take before meals. Do not open capsule. 6/13/25 12/10/25  Virginia M Factor, DO   insulin lispro (HumaLOG KwikPen Insulin) 100 unit/mL pen Inject 25 units with breakfast and 20 units with dinner 6/13/25   Virginia M Factor, DO " "  lubiprostone (Amitiza) 8 mcg capsule Take 1 capsule (8 mcg) by mouth 2 times daily (morning and late afternoon). 7/3/25   Khoa Ruiz MD   meclizine (Antivert) 25 mg tablet TAKE 1 TABLET (25 mg) BY MOUTH THREE TIMES DAILY AS NEEDED FOR DIZZINESS 10/30/24   Virginia M Factor, DO   ondansetron ODT (Zofran-ODT) 4 mg disintegrating tablet Dissolve 2 tablets (8 mg) in the mouth every 8 hours if needed for nausea or vomiting. 6/13/25 7/13/25  Virginia M Factor, DO   pen needle, diabetic 34 gauge x 9/64\" needle Use with Humalog and Toujeo (4 times a day total) 2/24/25   Virginia M Factor, DO   polyethylene glycol (GoLYTELY) 236-22.74-6.74 -5.86 gram solution Take 4,000 mL by mouth 1 time for 1 dose. Take as directed in instructions from office. 7/3/25 7/3/25  Khoa Ruiz MD   tirzepatide (Mounjaro) 5 mg/0.5 mL pen injector Inject 5 mg under the skin every 7 days. 7/3/25   Virginia M Factor, DO   semaglutide (Ozempic) 0.25 mg or 0.5 mg (2 mg/3 mL) pen injector Inject 0.25 mg under the skin every 7 days. 6/30/25 7/3/25  [facility], DO   tirzepatide (Mounjaro) 5 mg/0.5 mL pen injector Inject 5 mg under the skin every 7 days. 5/16/25 6/30/25  Virginia M Factor, DO         Relevant Results Recent labs reviewed in the EMR.    Lab Results   Component Value Date/Time    WBC 5.2 10/02/2024 1048    HGB 15.0 10/02/2024 1048    HGB 14.9 04/26/2023 0659    HGB 15.0 02/10/2022 1155    HGB 13.3 05/29/2020 0840    MCV 88 10/02/2024 1048     10/02/2024 1048     04/26/2023 0659     02/10/2022 1155     05/29/2020 0840    [id number] 411 10/02/2024 1048       Lab Results   Component Value Date/Time     12/31/2024 0843    K 4.1 12/31/2024 0843     12/31/2024 0843    BUN 20 12/31/2024 0843    CREATININE 0.61 12/31/2024 0843    CREATININE 0.55 10/02/2024 1048       Lab Results   Component Value Date/Time    BILITOT 1.1 12/31/2024 0843    BILITOT 0.6 10/02/2024 1048    BILITOT 0.6 " 04/26/2023 0659    BILITOT 0.7 06/15/2022 0759    BILITOT 0.8 02/10/2022 1155    ALKPHOS 117 (H) 12/31/2024 0843    ALKPHOS 165 (H) 10/02/2024 1048    ALKPHOS 116 (H) 04/26/2023 0659    ALKPHOS 126 (H) 06/15/2022 0759    ALKPHOS 183 (H) 02/10/2022 1155    AST 25 12/31/2024 0843    AST 30 10/02/2024 1048    AST 15 04/26/2023 0659    AST 25 06/15/2022 0759    AST 15 02/10/2022 1155    ALT 33 12/31/2024 0843    ALT 31 10/02/2024 1048    ALT 19 04/26/2023 0659    ALT 31 06/15/2022 0759    ALT 22 02/10/2022 1155         Radiology: Imaging reviewed in the EMR.  Imaging  No results found.    Cardiology, Vascular, and Other Imaging  No other imaging results found for the past 7 days                 [1]   Patient Active Problem List  Diagnosis    Generalized anxiety disorder    GERD (gastroesophageal reflux disease)    History of claustrophobia    Depression, major, single episode, moderate (Multi)    Chronic radicular low back pain    Chronic neck pain    Migrainous headache without aura    Plantar fascial fibromatosis of left foot    Uncontrolled type 2 diabetes mellitus with hyperglycemia    Vitamin B12 deficiency    Vitamin D deficiency    Overweight (BMI 25.0-29.9)    History of bilateral mastectomy    Chronic idiopathic constipation    Irritable bowel syndrome with constipation    Severe obesity with serious comorbidity and body mass index (BMI) 120% of 95th percentile to less than 140% of 95th percentile for age in pediatric patient   [2]   Family History  Problem Relation Name Age of Onset    Diabetes Mother      Alcohol abuse Mother Yohana Alvarez     COPD Mother Yohana Alvarez     Breast cancer Mother Yohana Alvarez     Alcohol abuse Father Kory Alvarez     COPD Father Kory Alvarez     Heart disease Father Kory Alvarez     Asthma Paternal Grandmother Kory Olliethom  Ellen Berisford     Cancer Maternal Grandfather [facility] Longoria     Diabetes Maternal Grandfather [employer]     Drug abuse Brother  Jayant Longoria     Alcohol abuse Father Kory Alvarez     COPD Father Kory Alvarez     Heart disease Father Kory Alvarez     Asthma Paternal Grandmother Kory lAvarez     Cancer Maternal Grandfather Ivory Longoria     Diabetes Maternal Grandfather Ivory Longoria     Drug abuse Brother Jayant Longoria

## 2025-07-03 NOTE — ASSESSMENT & PLAN NOTE
Bright red blood per rectum primarily with straining, and hemorrhoids suspected but no colonoscopy for many years and thus plan is to exclude alternative pathology.  Benefits risks alternatives of monitored anesthesia care and EGD and colonoscopy were discussed and informed consent obtained.  We will give her a GoLytely bowel preparation.  Orders:    Colonoscopy Diagnostic; Future    polyethylene glycol (GoLYTELY) 236-22.74-6.74 -5.86 gram solution; Take 4,000 mL by mouth 1 time for 1 dose. Take as directed in instructions from office.    Follow Up In Gastroenterology; Future

## 2025-07-03 NOTE — TELEPHONE ENCOUNTER
Patients BMI does not meet bariatric programs qualifications. Patient was advised the she does not qualify. Patient is very up set because she was told by gastro  That she meets qualifications because of her hiatal hernia and diabetes. she was offered an apt for her hiatal hernia and rejected the apt because she was told by her gastro  That there is no point in that surgery. Patient stated that she would find another  And hung up the phone.

## 2025-07-07 ENCOUNTER — PATIENT MESSAGE (OUTPATIENT)
Dept: PRIMARY CARE | Facility: CLINIC | Age: 50
End: 2025-07-07
Payer: COMMERCIAL

## 2025-07-14 ENCOUNTER — TELEPHONE (OUTPATIENT)
Dept: PRIMARY CARE | Facility: CLINIC | Age: 50
End: 2025-07-14
Payer: COMMERCIAL

## 2025-07-14 ENCOUNTER — ANESTHESIA EVENT (OUTPATIENT)
Dept: GASTROENTEROLOGY | Facility: HOSPITAL | Age: 50
End: 2025-07-14
Payer: COMMERCIAL

## 2025-07-14 ENCOUNTER — TELEPHONE (OUTPATIENT)
Dept: PHARMACY | Facility: HOSPITAL | Age: 50
End: 2025-07-14
Payer: COMMERCIAL

## 2025-07-14 DIAGNOSIS — E11.65 UNCONTROLLED TYPE 2 DIABETES MELLITUS WITH HYPERGLYCEMIA: Primary | ICD-10-CM

## 2025-07-14 RX ORDER — SEMAGLUTIDE 0.68 MG/ML
0.25 INJECTION, SOLUTION SUBCUTANEOUS
Qty: 3 ML | Refills: 2 | Status: SHIPPED | OUTPATIENT
Start: 2025-07-14

## 2025-07-14 NOTE — TELEPHONE ENCOUNTER
Patient called and is getting colonoscopy tomorrow. Was wondering if she should be taking Humalog if she is on a clear liquid diet today. Told her to not take any Humalog until she is eating again after.     She also states she spoke with her GI doctor who recommends she trial Ozempic for her GI upset. Will send in RX. PA was approved few weeks ago.    Problem List Items Addressed This Visit       Uncontrolled type 2 diabetes mellitus with hyperglycemia - Primary (Chronic)    Relevant Medications    semaglutide (Ozempic) 0.25 mg or 0.5 mg (2 mg/3 mL) pen injector        Carolynn Porter, PharmD  Clinical Pharmacy Specialist - Primary Care  [phone]  [email or web address]

## 2025-07-14 NOTE — TELEPHONE ENCOUNTER
Prior authorization request received via fax for    Ozempic (0.25 or 0.5 mg/dose) 2mg/3ml Pen Injectors    Form given to: PLACED IN LEAD MA'S BOX ON [id number]

## 2025-07-14 NOTE — TELEPHONE ENCOUNTER
Prior authorization request received via fax for    Semaglutide    Form given to: PLACED IN LEAD MA'S BOX ON [id number]

## 2025-07-15 ENCOUNTER — ANESTHESIA (OUTPATIENT)
Dept: GASTROENTEROLOGY | Facility: HOSPITAL | Age: 50
End: 2025-07-15
Payer: COMMERCIAL

## 2025-07-15 ENCOUNTER — HOSPITAL ENCOUNTER (OUTPATIENT)
Dept: GASTROENTEROLOGY | Facility: HOSPITAL | Age: 50
Discharge: HOME | End: 2025-07-15
Payer: COMMERCIAL

## 2025-07-15 VITALS
WEIGHT: 195 LBS | BODY MASS INDEX: 28.88 KG/M2 | HEART RATE: 78 BPM | OXYGEN SATURATION: 99 % | TEMPERATURE: 97 F | DIASTOLIC BLOOD PRESSURE: 61 MMHG | RESPIRATION RATE: 13 BRPM | HEIGHT: 69 IN | SYSTOLIC BLOOD PRESSURE: 115 MMHG

## 2025-07-15 DIAGNOSIS — K59.04 CHRONIC IDIOPATHIC CONSTIPATION: ICD-10-CM

## 2025-07-15 DIAGNOSIS — K58.1 IRRITABLE BOWEL SYNDROME WITH CONSTIPATION: ICD-10-CM

## 2025-07-15 DIAGNOSIS — K21.00 GASTROESOPHAGEAL REFLUX DISEASE WITH ESOPHAGITIS WITHOUT HEMORRHAGE: ICD-10-CM

## 2025-07-15 DIAGNOSIS — K21.00 GASTROESOPHAGEAL REFLUX DISEASE WITH ESOPHAGITIS WITHOUT HEMORRHAGE: Primary | ICD-10-CM

## 2025-07-15 LAB — GLUCOSE BLD MANUAL STRIP-MCNC: 185 MG/DL (ref 74–99)

## 2025-07-15 PROCEDURE — 7100000009 HC PHASE TWO TIME - INITIAL BASE CHARGE

## 2025-07-15 PROCEDURE — 3700000002 HC GENERAL ANESTHESIA TIME - EACH INCREMENTAL 1 MINUTE

## 2025-07-15 PROCEDURE — 3700000001 HC GENERAL ANESTHESIA TIME - INITIAL BASE CHARGE

## 2025-07-15 PROCEDURE — 7100000010 HC PHASE TWO TIME - EACH INCREMENTAL 1 MINUTE

## 2025-07-15 PROCEDURE — 82947 ASSAY GLUCOSE BLOOD QUANT: CPT

## 2025-07-15 PROCEDURE — 2500000004 HC RX 250 GENERAL PHARMACY W/ HCPCS (ALT 636 FOR OP/ED): Performed by: NURSE ANESTHETIST, CERTIFIED REGISTERED

## 2025-07-15 PROCEDURE — 43239 EGD BIOPSY SINGLE/MULTIPLE: CPT | Performed by: INTERNAL MEDICINE

## 2025-07-15 PROCEDURE — 2500000004 HC RX 250 GENERAL PHARMACY W/ HCPCS (ALT 636 FOR OP/ED): Performed by: INTERNAL MEDICINE

## 2025-07-15 PROCEDURE — 45380 COLONOSCOPY AND BIOPSY: CPT | Performed by: INTERNAL MEDICINE

## 2025-07-15 RX ORDER — PROPOFOL 10 MG/ML
INJECTION, EMULSION INTRAVENOUS AS NEEDED
Status: DISCONTINUED | OUTPATIENT
Start: 2025-07-15 | End: 2025-07-15

## 2025-07-15 RX ORDER — SUCRALFATE 1 G/10ML
1 SUSPENSION ORAL
Status: DISCONTINUED | OUTPATIENT
Start: 2025-07-15 | End: 2025-07-15

## 2025-07-15 RX ORDER — FENTANYL CITRATE 50 UG/ML
INJECTION, SOLUTION INTRAMUSCULAR; INTRAVENOUS AS NEEDED
Status: DISCONTINUED | OUTPATIENT
Start: 2025-07-15 | End: 2025-07-15

## 2025-07-15 RX ORDER — SUCRALFATE 1 G/1
1 TABLET ORAL 4 TIMES DAILY
Qty: 120 TABLET | Refills: 0 | Status: SHIPPED | OUTPATIENT
Start: 2025-07-15 | End: 2025-08-14

## 2025-07-15 RX ORDER — SODIUM CHLORIDE 9 MG/ML
20 INJECTION, SOLUTION INTRAVENOUS CONTINUOUS
Status: ACTIVE | OUTPATIENT
Start: 2025-07-15 | End: 2025-07-15

## 2025-07-15 RX ORDER — LIDOCAINE HYDROCHLORIDE 20 MG/ML
INJECTION, SOLUTION INFILTRATION; PERINEURAL AS NEEDED
Status: DISCONTINUED | OUTPATIENT
Start: 2025-07-15 | End: 2025-07-15

## 2025-07-15 RX ADMIN — PROPOFOL 50 MG: 10 INJECTION, EMULSION INTRAVENOUS at 08:18

## 2025-07-15 RX ADMIN — FENTANYL CITRATE 50 MCG: 50 INJECTION INTRAMUSCULAR; INTRAVENOUS at 07:56

## 2025-07-15 RX ADMIN — PROPOFOL 50 MG: 10 INJECTION, EMULSION INTRAVENOUS at 08:13

## 2025-07-15 RX ADMIN — PROPOFOL 50 MG: 10 INJECTION, EMULSION INTRAVENOUS at 08:00

## 2025-07-15 RX ADMIN — SODIUM CHLORIDE 20 ML/HR: 0.9 INJECTION, SOLUTION INTRAVENOUS at 07:34

## 2025-07-15 RX ADMIN — PROPOFOL 20 MG: 10 INJECTION, EMULSION INTRAVENOUS at 08:24

## 2025-07-15 RX ADMIN — PROPOFOL 100 MG: 10 INJECTION, EMULSION INTRAVENOUS at 07:56

## 2025-07-15 RX ADMIN — FENTANYL CITRATE 25 MCG: 50 INJECTION INTRAMUSCULAR; INTRAVENOUS at 08:00

## 2025-07-15 RX ADMIN — PROPOFOL 50 MG: 10 INJECTION, EMULSION INTRAVENOUS at 08:09

## 2025-07-15 RX ADMIN — LIDOCAINE HYDROCHLORIDE 2 ML: 20 INJECTION, SOLUTION INFILTRATION; PERINEURAL at 07:56

## 2025-07-15 RX ADMIN — PROPOFOL 30 MG: 10 INJECTION, EMULSION INTRAVENOUS at 08:28

## 2025-07-15 RX ADMIN — FENTANYL CITRATE 25 MCG: 50 INJECTION INTRAMUSCULAR; INTRAVENOUS at 08:05

## 2025-07-15 RX ADMIN — PROPOFOL 50 MG: 10 INJECTION, EMULSION INTRAVENOUS at 08:05

## 2025-07-15 SDOH — HEALTH STABILITY: MENTAL HEALTH: CURRENT SMOKER: 0

## 2025-07-15 ASSESSMENT — PAIN - FUNCTIONAL ASSESSMENT
PAIN_FUNCTIONAL_ASSESSMENT: 0-10

## 2025-07-15 ASSESSMENT — COLUMBIA-SUICIDE SEVERITY RATING SCALE - C-SSRS
6. HAVE YOU EVER DONE ANYTHING, STARTED TO DO ANYTHING, OR PREPARED TO DO ANYTHING TO END YOUR LIFE?: NO
2. HAVE YOU ACTUALLY HAD ANY THOUGHTS OF KILLING YOURSELF?: NO
1. IN THE PAST MONTH, HAVE YOU WISHED YOU WERE DEAD OR WISHED YOU COULD GO TO SLEEP AND NOT WAKE UP?: NO

## 2025-07-15 ASSESSMENT — PAIN SCALES - GENERAL
PAIN_LEVEL: 0
PAINLEVEL_OUTOF10: 0 - NO PAIN

## 2025-07-15 NOTE — ANESTHESIA POSTPROCEDURE EVALUATION
Patient: Anjali Chamorro    Procedure Summary       Date: 07/15/25 Room / Location: [facility]    Anesthesia Start: 0750 Anesthesia Stop: 0836    Procedures:       EGD      COLONOSCOPY Diagnosis:       Gastroesophageal reflux disease with esophagitis without hemorrhage      Chronic idiopathic constipation      Irritable bowel syndrome with constipation    Scheduled Providers: Khoa Ruiz MD Responsible Provider: UNA Artis    Anesthesia Type: MAC ASA Status: 3            Anesthesia Type: MAC    Vitals Value Taken Time   /61 07/15/25 08:55   Temp 36.1 °C (97 °F) 07/15/25 08:55   Pulse 78 07/15/25 08:55   Resp 13 07/15/25 08:55   SpO2 99 % 07/15/25 08:55       Anesthesia Post Evaluation    Patient location during evaluation: bedside  Patient participation: complete - patient participated  Level of consciousness: awake and alert  Pain score: 0  Pain management: adequate  Airway patency: patent  Cardiovascular status: stable  Respiratory status: acceptable  Hydration status: acceptable  Postoperative Nausea and Vomiting: none        There were no known notable events for this encounter.

## 2025-07-15 NOTE — ANESTHESIA PREPROCEDURE EVALUATION
Patient: Anjali Chamorro    Procedure Information       Date/Time: 07/15/25 0800    Scheduled providers: Khoa Ruiz MD    Procedures:       EGD      COLONOSCOPY    Location: [facility]            Relevant Problems   Anesthesia (within normal limits)      Neuro   (+) Chronic radicular low back pain   (+) Depression, major, single episode, moderate (Multi)   (+) Generalized anxiety disorder      GI   (+) GERD (gastroesophageal reflux disease)   (+) Irritable bowel syndrome with constipation      Endocrine   (+) Severe obesity with serious comorbidity and body mass index (BMI) 120% of 95th percentile to less than 140% of 95th percentile for age in pediatric patient   (+) Uncontrolled type 2 diabetes mellitus with hyperglycemia      Musculoskeletal   (+) Chronic neck pain       Clinical information reviewed:   Tobacco  Allergies  Meds   Med Hx  Surg Hx  OB Status  Fam Hx  Soc   Hx        NPO Detail:  NPO/Void Status  Date of Last Liquid: 07/15/25  Time of Last Liquid: 0100  Date of Last Solid: 07/13/25  Time of Last Solid: 1800  Last Intake Type: GI prep  Time of Last Void: 0702         Physical Exam    Airway  Mallampati: II  TM distance: >3 FB  Neck ROM: full  Mouth opening: 3 or more finger widths     Cardiovascular - normal exam  Rhythm: regular  Rate: normal     Dental - normal exam     Pulmonary - normal exam   Abdominal - normal exam           Anesthesia Plan    History of general anesthesia?: yes  History of complications of general anesthesia?: no    ASA 3     MAC     The patient is not a current smoker.    intravenous induction   Anesthetic plan and risks discussed with patient.

## 2025-07-16 ENCOUNTER — TELEPHONE (OUTPATIENT)
Dept: PRIMARY CARE | Facility: CLINIC | Age: 50
End: 2025-07-16
Payer: COMMERCIAL

## 2025-07-16 ENCOUNTER — PATIENT MESSAGE (OUTPATIENT)
Dept: PHARMACY | Facility: HOSPITAL | Age: 50
End: 2025-07-16
Payer: COMMERCIAL

## 2025-07-16 NOTE — TELEPHONE ENCOUNTER
Prior authorization request received via fax for : ozempic 0.25 or 0.5    Form given to: PLACED IN LEAD MA'S BOX ON: 7/16/2025

## 2025-07-18 ENCOUNTER — DOCUMENTATION (OUTPATIENT)
Dept: PHARMACY | Facility: HOSPITAL | Age: 50
End: 2025-07-18
Payer: COMMERCIAL

## 2025-07-18 LAB
ALBUMIN SERPL-MCNC: 4 G/DL (ref 3.6–5.1)
ALP SERPL-CCNC: 121 U/L (ref 37–153)
ALT SERPL-CCNC: 19 U/L (ref 6–29)
ANION GAP SERPL CALCULATED.4IONS-SCNC: 9 MMOL/L (CALC) (ref 7–17)
AST SERPL-CCNC: 24 U/L (ref 10–35)
BILIRUB SERPL-MCNC: 0.7 MG/DL (ref 0.2–1.2)
BUN SERPL-MCNC: 14 MG/DL (ref 7–25)
CALCIUM SERPL-MCNC: 9 MG/DL (ref 8.6–10.4)
CHLORIDE SERPL-SCNC: 101 MMOL/L (ref 98–110)
CO2 SERPL-SCNC: 28 MMOL/L (ref 20–32)
CREAT SERPL-MCNC: 0.66 MG/DL (ref 0.5–1.03)
EGFRCR SERPLBLD CKD-EPI 2021: 106 ML/MIN/1.73M2
GLUCOSE SERPL-MCNC: 194 MG/DL (ref 65–99)
MAGNESIUM SERPL-MCNC: 2.2 MG/DL (ref 1.5–2.5)
POTASSIUM SERPL-SCNC: 4.3 MMOL/L (ref 3.5–5.3)
PROT SERPL-MCNC: 6.4 G/DL (ref 6.1–8.1)
SODIUM SERPL-SCNC: 138 MMOL/L (ref 135–146)

## 2025-07-19 LAB
EST. AVERAGE GLUCOSE BLD GHB EST-MCNC: 163 MG/DL
EST. AVERAGE GLUCOSE BLD GHB EST-SCNC: 9 MMOL/L
HBA1C MFR BLD: 7.3 %

## 2025-07-21 LAB
LABORATORY COMMENT REPORT: NORMAL
PATH REPORT.FINAL DX SPEC: NORMAL
PATH REPORT.GROSS SPEC: NORMAL
PATH REPORT.RELEVANT HX SPEC: NORMAL
PATH REPORT.TOTAL CANCER: NORMAL

## 2025-07-23 RX ORDER — NARATRIPTAN 2.5 MG/1
2.5 TABLET ORAL
COMMUNITY
Start: 2025-07-14

## 2025-07-23 NOTE — ASSESSMENT & PLAN NOTE
Using CGM  Significant improvement per recent A1C of 7.3%  Having lows between 9 PM and 1 AM  Off Basal insulin Toujeo at the end of May due to nocturnal hypoglycemia  Continue Mounjaro and Humalog  Continue working with clinical pharmacist     Orders:    blood-glucose sensor (FreeStyle Gt 3 Plus Sensor) device; Use to check blood glucose continuously. Change sensor every 15 days    TSH; Future    T4, free; Future    Thyroid Peroxidase (TPO) Antibody; Future    T3, free; Future    High sensitivity CRP; Future

## 2025-07-23 NOTE — PATIENT INSTRUCTIONS
Current weight: 87.1 kg (192 lb)  Weight change since last visit (-) denotes wt loss -3 lbs   Weight loss needed to achieve BMI 30: -7.8 Lbs      Thank you for choosing [facility] for your healthcare.   As always if you have any questions or concerns please do not hesitate to call our office at [phone] or through [employer].    Have a great day!  Leann Tidwell, DO

## 2025-07-24 ENCOUNTER — APPOINTMENT (OUTPATIENT)
Dept: PRIMARY CARE | Facility: CLINIC | Age: 50
End: 2025-07-24
Payer: COMMERCIAL

## 2025-07-24 VITALS
OXYGEN SATURATION: 99 % | WEIGHT: 192 LBS | HEART RATE: 78 BPM | RESPIRATION RATE: 21 BRPM | BODY MASS INDEX: 28.44 KG/M2 | TEMPERATURE: 97.1 F | SYSTOLIC BLOOD PRESSURE: 113 MMHG | DIASTOLIC BLOOD PRESSURE: 73 MMHG | HEIGHT: 69 IN

## 2025-07-24 DIAGNOSIS — G43.009 MIGRAINE WITHOUT AURA AND WITHOUT STATUS MIGRAINOSUS, NOT INTRACTABLE: Chronic | ICD-10-CM

## 2025-07-24 DIAGNOSIS — Z79.899 OTHER LONG TERM (CURRENT) DRUG THERAPY: ICD-10-CM

## 2025-07-24 DIAGNOSIS — Z11.59 NEED FOR HEPATITIS C SCREENING TEST: ICD-10-CM

## 2025-07-24 DIAGNOSIS — E66.3 OVERWEIGHT (BMI 25.0-29.9): Chronic | ICD-10-CM

## 2025-07-24 DIAGNOSIS — E83.19 IRON OVERLOAD: ICD-10-CM

## 2025-07-24 DIAGNOSIS — E11.65 UNCONTROLLED TYPE 2 DIABETES MELLITUS WITH HYPERGLYCEMIA: Primary | ICD-10-CM

## 2025-07-24 PROBLEM — E66.01: Status: RESOLVED | Noted: 2025-07-03 | Resolved: 2025-07-24

## 2025-07-24 PROBLEM — Z68.55: Status: RESOLVED | Noted: 2025-07-03 | Resolved: 2025-07-24

## 2025-07-24 PROCEDURE — 3074F SYST BP LT 130 MM HG: CPT | Performed by: FAMILY MEDICINE

## 2025-07-24 PROCEDURE — 3078F DIAST BP <80 MM HG: CPT | Performed by: FAMILY MEDICINE

## 2025-07-24 PROCEDURE — 1036F TOBACCO NON-USER: CPT | Performed by: FAMILY MEDICINE

## 2025-07-24 PROCEDURE — 99214 OFFICE O/P EST MOD 30 MIN: CPT | Performed by: FAMILY MEDICINE

## 2025-07-24 PROCEDURE — 3008F BODY MASS INDEX DOCD: CPT | Performed by: FAMILY MEDICINE

## 2025-07-24 RX ORDER — BLOOD-GLUCOSE SENSOR
EACH MISCELLANEOUS
Qty: 3 EACH | Refills: 11 | Status: SHIPPED | OUTPATIENT
Start: 2025-07-24

## 2025-07-24 SDOH — ECONOMIC STABILITY: FOOD INSECURITY: WITHIN THE PAST 12 MONTHS, THE FOOD YOU BOUGHT JUST DIDN'T LAST AND YOU DIDN'T HAVE MONEY TO GET MORE.: NEVER TRUE

## 2025-07-24 SDOH — ECONOMIC STABILITY: FOOD INSECURITY: WITHIN THE PAST 12 MONTHS, YOU WORRIED THAT YOUR FOOD WOULD RUN OUT BEFORE YOU GOT MONEY TO BUY MORE.: NEVER TRUE

## 2025-07-24 ASSESSMENT — PATIENT HEALTH QUESTIONNAIRE - PHQ9
SUM OF ALL RESPONSES TO PHQ9 QUESTIONS 1 & 2: 1
2. FEELING DOWN, DEPRESSED OR HOPELESS: SEVERAL DAYS
1. LITTLE INTEREST OR PLEASURE IN DOING THINGS: NOT AT ALL

## 2025-07-24 ASSESSMENT — LIFESTYLE VARIABLES
SKIP TO QUESTIONS 9-10: 1
HOW OFTEN DO YOU HAVE SIX OR MORE DRINKS ON ONE OCCASION: NEVER
AUDIT-C TOTAL SCORE: 1
HOW MANY STANDARD DRINKS CONTAINING ALCOHOL DO YOU HAVE ON A TYPICAL DAY: 1 OR 2
HOW OFTEN DO YOU HAVE A DRINK CONTAINING ALCOHOL: MONTHLY OR LESS

## 2025-07-24 ASSESSMENT — PAIN SCALES - GENERAL: PAINLEVEL_OUTOF10: 0-NO PAIN

## 2025-07-24 NOTE — ASSESSMENT & PLAN NOTE
Some improvement with aimovig 70 mg, continue. Discussed increase dose in future  Has failed topiramate and zoniamide  Saw [facility] specialist who added naratriptan as abortive medication

## 2025-07-25 LAB
25(OH)D3+25(OH)D2 SERPL-MCNC: 19 NG/ML (ref 30–100)
CRP SERPL HS-MCNC: NORMAL MG/L
FERRITIN SERPL-MCNC: 165 NG/ML (ref 16–232)
HCV AB SERPL QL IA: NORMAL
IRON SERPL-MCNC: 62 MCG/DL (ref 45–160)
T3FREE SERPL-MCNC: 3.2 PG/ML (ref 2.3–4.2)
T4 FREE SERPL-MCNC: 1.1 NG/DL (ref 0.8–1.8)
THYROPEROXIDASE AB SERPL-ACNC: NORMAL [IU]/ML
TSH SERPL-ACNC: 1.87 MIU/L

## 2025-07-28 ENCOUNTER — DOCUMENTATION (OUTPATIENT)
Dept: PHARMACY | Facility: HOSPITAL | Age: 50
End: 2025-07-28
Payer: COMMERCIAL

## 2025-07-28 NOTE — PROGRESS NOTES
Appeal for Ozempic is still pending. Takes ~ 15 days.    Carolynn Porter, PharmD  Clinical Pharmacy Specialist - Primary Care  [phone]  [email or web address]

## 2025-07-31 LAB
25(OH)D3+25(OH)D2 SERPL-MCNC: 19 NG/ML (ref 30–100)
CRP SERPL HS-MCNC: 6.1 MG/L
FERRITIN SERPL-MCNC: 165 NG/ML (ref 16–232)
HCV AB SERPL QL IA: NORMAL
IRON SERPL-MCNC: 62 MCG/DL (ref 45–160)
T3FREE SERPL-MCNC: 3.2 PG/ML (ref 2.3–4.2)
T4 FREE SERPL-MCNC: 1.1 NG/DL (ref 0.8–1.8)
THYROPEROXIDASE AB SERPL-ACNC: NORMAL [IU]/ML
TSH SERPL-ACNC: 1.87 MIU/L

## 2025-08-02 ENCOUNTER — RESULTS FOLLOW-UP (OUTPATIENT)
Dept: PRIMARY CARE | Facility: CLINIC | Age: 50
End: 2025-08-02
Payer: COMMERCIAL

## 2025-08-02 DIAGNOSIS — Z13.6 SCREENING FOR HEART DISEASE: Primary | ICD-10-CM

## 2025-08-04 ENCOUNTER — TELEPHONE (OUTPATIENT)
Dept: PRIMARY CARE | Facility: CLINIC | Age: 50
End: 2025-08-04
Payer: COMMERCIAL

## 2025-08-04 ENCOUNTER — TELEPHONE (OUTPATIENT)
Facility: CLINIC | Age: 50
End: 2025-08-04
Payer: COMMERCIAL

## 2025-08-04 DIAGNOSIS — K21.00 GASTROESOPHAGEAL REFLUX DISEASE WITH ESOPHAGITIS WITHOUT HEMORRHAGE: ICD-10-CM

## 2025-08-04 LAB
25(OH)D3+25(OH)D2 SERPL-MCNC: 19 NG/ML (ref 30–100)
CRP SERPL HS-MCNC: 6.1 MG/L
FERRITIN SERPL-MCNC: 165 NG/ML (ref 16–232)
HCV AB SERPL QL IA: NORMAL
IRON SERPL-MCNC: 62 MCG/DL (ref 45–160)
T3FREE SERPL-MCNC: 3.2 PG/ML (ref 2.3–4.2)
T4 FREE SERPL-MCNC: 1.1 NG/DL (ref 0.8–1.8)
THYROPEROXIDASE AB SERPL-ACNC: <1 IU/ML
TSH SERPL-ACNC: 1.87 MIU/L

## 2025-08-04 RX ORDER — SUCRALFATE 1 G/1
1 TABLET ORAL 4 TIMES DAILY
Qty: 120 TABLET | Refills: 1 | Status: SHIPPED | OUTPATIENT
Start: 2025-08-04 | End: 2025-09-03

## 2025-08-04 NOTE — TELEPHONE ENCOUNTER
Patient requesting order for Cardiac CT to be faxed to [facility] [phone]

## 2025-08-07 ENCOUNTER — DOCUMENTATION (OUTPATIENT)
Dept: PHARMACY | Facility: HOSPITAL | Age: 50
End: 2025-08-07
Payer: COMMERCIAL

## 2025-08-07 NOTE — PROGRESS NOTES
Appeal for Ozempic approved. Called patient to let her know. She will call pharmacy to have them reprocess as it should go through now. She plans to start next week. We will cancel tomorrow's appt and talk in 4 weeks once she has had a few doses of Ozempic in her system.    Carolynn Porter, PharmD  Clinical Pharmacy Specialist - Primary Care  [phone]  [email or web address]

## 2025-08-08 ENCOUNTER — APPOINTMENT (OUTPATIENT)
Dept: PHARMACY | Facility: HOSPITAL | Age: 50
End: 2025-08-08
Payer: COMMERCIAL

## 2025-08-27 ENCOUNTER — HOSPITAL ENCOUNTER (OUTPATIENT)
Dept: RADIOLOGY | Facility: HOSPITAL | Age: 50
Discharge: HOME | End: 2025-08-27
Payer: COMMERCIAL

## 2025-08-27 DIAGNOSIS — E11.65 UNCONTROLLED TYPE 2 DIABETES MELLITUS WITH HYPERGLYCEMIA: Primary | ICD-10-CM

## 2025-08-27 DIAGNOSIS — Z13.6 SCREENING FOR HEART DISEASE: ICD-10-CM

## 2025-08-27 PROCEDURE — 75571 CT HRT W/O DYE W/CA TEST: CPT

## 2025-08-27 RX ORDER — LANCETS
EACH MISCELLANEOUS
Qty: 100 EACH | Refills: 11 | Status: SHIPPED | OUTPATIENT
Start: 2025-08-27

## 2025-08-27 RX ORDER — INSULIN PUMP SYRINGE, 3 ML
EACH MISCELLANEOUS
Qty: 1 EACH | Refills: 0 | Status: SHIPPED | OUTPATIENT
Start: 2025-08-27

## 2025-08-27 RX ORDER — IBUPROFEN 200 MG
CAPSULE ORAL
Qty: 100 EACH | Refills: 11 | Status: SHIPPED | OUTPATIENT
Start: 2025-08-27

## 2025-09-04 ENCOUNTER — APPOINTMENT (OUTPATIENT)
Dept: PHARMACY | Facility: HOSPITAL | Age: 50
End: 2025-09-04
Payer: COMMERCIAL

## 2025-09-04 ENCOUNTER — TELEPHONE (OUTPATIENT)
Dept: PHARMACY | Facility: HOSPITAL | Age: 50
End: 2025-09-04

## 2025-09-04 DIAGNOSIS — B37.31 VAGINA, CANDIDIASIS: Primary | ICD-10-CM

## 2025-09-04 RX ORDER — FLUCONAZOLE 150 MG/1
150 TABLET ORAL ONCE
Qty: 1 TABLET | Refills: 0 | Status: SHIPPED | OUTPATIENT
Start: 2025-09-04 | End: 2025-09-04

## 2025-10-02 ENCOUNTER — APPOINTMENT (OUTPATIENT)
Dept: PHARMACY | Facility: HOSPITAL | Age: 50
End: 2025-10-02
Payer: COMMERCIAL

## 2025-12-08 ENCOUNTER — APPOINTMENT (OUTPATIENT)
Dept: PRIMARY CARE | Facility: CLINIC | Age: 50
End: 2025-12-08
Payer: COMMERCIAL

## 2026-04-10 ENCOUNTER — APPOINTMENT (OUTPATIENT)
Dept: PRIMARY CARE | Facility: CLINIC | Age: 51
End: 2026-04-10
Payer: COMMERCIAL